# Patient Record
Sex: MALE | Race: WHITE | Employment: FULL TIME | URBAN - METROPOLITAN AREA
[De-identification: names, ages, dates, MRNs, and addresses within clinical notes are randomized per-mention and may not be internally consistent; named-entity substitution may affect disease eponyms.]

---

## 2017-08-22 ENCOUNTER — OFFICE VISIT (OUTPATIENT)
Dept: FAMILY MEDICINE CLINIC | Facility: CLINIC | Age: 57
End: 2017-08-22

## 2017-08-22 VITALS
OXYGEN SATURATION: 98 % | WEIGHT: 191 LBS | TEMPERATURE: 99 F | SYSTOLIC BLOOD PRESSURE: 118 MMHG | RESPIRATION RATE: 18 BRPM | HEIGHT: 70 IN | HEART RATE: 72 BPM | DIASTOLIC BLOOD PRESSURE: 82 MMHG | BODY MASS INDEX: 27.35 KG/M2

## 2017-08-22 DIAGNOSIS — K63.5 HYPERPLASTIC COLONIC POLYP, UNSPECIFIED PART OF COLON: Primary | ICD-10-CM

## 2017-08-22 PROCEDURE — 99213 OFFICE O/P EST LOW 20 MIN: CPT | Performed by: FAMILY MEDICINE

## 2017-08-22 NOTE — PROGRESS NOTES
First-time visit her to the practice previously on under the care of Gary Mensah. He is a  working at [employer] most of his day. He has a remote history of benign colon polyps. The father of 2 sons Tony Villanueva and Genet Guerrero.   Genet Guerrero suffers from higher fun

## 2017-10-17 ENCOUNTER — OFFICE VISIT (OUTPATIENT)
Dept: FAMILY MEDICINE CLINIC | Facility: CLINIC | Age: 57
End: 2017-10-17

## 2017-10-17 VITALS
BODY MASS INDEX: 27.63 KG/M2 | HEIGHT: 70 IN | HEART RATE: 62 BPM | TEMPERATURE: 98 F | DIASTOLIC BLOOD PRESSURE: 84 MMHG | SYSTOLIC BLOOD PRESSURE: 124 MMHG | OXYGEN SATURATION: 98 % | RESPIRATION RATE: 18 BRPM | WEIGHT: 193 LBS

## 2017-10-17 DIAGNOSIS — Z13.29 SCREENING FOR THYROID DISORDER: ICD-10-CM

## 2017-10-17 DIAGNOSIS — Z00.00 LABORATORY EXAM ORDERED AS PART OF ROUTINE GENERAL MEDICAL EXAMINATION: Primary | ICD-10-CM

## 2017-10-17 DIAGNOSIS — Z13.1 SCREENING FOR DIABETES MELLITUS: ICD-10-CM

## 2017-10-17 DIAGNOSIS — Z11.59 ENCOUNTER FOR HEPATITIS C SCREENING TEST FOR LOW RISK PATIENT: ICD-10-CM

## 2017-10-17 DIAGNOSIS — Z00.00 ROUTINE GENERAL MEDICAL EXAMINATION AT A HEALTH CARE FACILITY: ICD-10-CM

## 2017-10-17 DIAGNOSIS — Z12.5 ENCOUNTER FOR SCREENING FOR MALIGNANT NEOPLASM OF PROSTATE: ICD-10-CM

## 2017-10-17 DIAGNOSIS — Z13.21 ENCOUNTER FOR VITAMIN DEFICIENCY SCREENING: ICD-10-CM

## 2017-10-17 DIAGNOSIS — Z13.220 SCREENING FOR LIPID DISORDERS: ICD-10-CM

## 2017-10-17 DIAGNOSIS — Z13.0 SCREENING, ANEMIA, DEFICIENCY, IRON: ICD-10-CM

## 2017-10-17 PROCEDURE — 99396 PREV VISIT EST AGE 40-64: CPT | Performed by: FAMILY MEDICINE

## 2017-10-17 RX ORDER — SILDENAFIL 50 MG/1
50 TABLET, FILM COATED ORAL
Qty: 12 TABLET | Refills: 6 | Status: SHIPPED | OUTPATIENT
Start: 2017-10-17 | End: 2019-02-01

## 2017-10-17 NOTE — PROGRESS NOTES
Presents for annual physical.  Has no complaints. Exercises regularly and without adverse side effects. Does not smoke and drinks alcohol modestly. He is up-to-date on vaccines and declines this years flu shot.     Sees a dentist and an eye doctor teto

## 2017-10-18 ENCOUNTER — LAB ENCOUNTER (OUTPATIENT)
Dept: LAB | Age: 57
End: 2017-10-18
Attending: FAMILY MEDICINE
Payer: COMMERCIAL

## 2017-10-18 ENCOUNTER — TELEPHONE (OUTPATIENT)
Dept: FAMILY MEDICINE CLINIC | Facility: CLINIC | Age: 57
End: 2017-10-18

## 2017-10-18 DIAGNOSIS — Z13.220 SCREENING FOR LIPID DISORDERS: ICD-10-CM

## 2017-10-18 DIAGNOSIS — Z00.00 LABORATORY EXAM ORDERED AS PART OF ROUTINE GENERAL MEDICAL EXAMINATION: ICD-10-CM

## 2017-10-18 DIAGNOSIS — Z11.59 ENCOUNTER FOR HEPATITIS C SCREENING TEST FOR LOW RISK PATIENT: ICD-10-CM

## 2017-10-18 DIAGNOSIS — Z13.21 ENCOUNTER FOR VITAMIN DEFICIENCY SCREENING: ICD-10-CM

## 2017-10-18 DIAGNOSIS — Z12.5 ENCOUNTER FOR SCREENING FOR MALIGNANT NEOPLASM OF PROSTATE: ICD-10-CM

## 2017-10-18 DIAGNOSIS — Z13.1 SCREENING FOR DIABETES MELLITUS: ICD-10-CM

## 2017-10-18 DIAGNOSIS — Z13.0 SCREENING, ANEMIA, DEFICIENCY, IRON: ICD-10-CM

## 2017-10-18 DIAGNOSIS — Z13.29 SCREENING FOR THYROID DISORDER: ICD-10-CM

## 2017-10-18 PROCEDURE — 36415 COLL VENOUS BLD VENIPUNCTURE: CPT | Performed by: FAMILY MEDICINE

## 2017-10-18 PROCEDURE — 80061 LIPID PANEL: CPT | Performed by: FAMILY MEDICINE

## 2017-10-18 PROCEDURE — 80050 GENERAL HEALTH PANEL: CPT | Performed by: FAMILY MEDICINE

## 2017-10-18 PROCEDURE — 82306 VITAMIN D 25 HYDROXY: CPT | Performed by: FAMILY MEDICINE

## 2017-10-18 PROCEDURE — 86803 HEPATITIS C AB TEST: CPT | Performed by: FAMILY MEDICINE

## 2017-10-18 PROCEDURE — 84153 ASSAY OF PSA TOTAL: CPT | Performed by: FAMILY MEDICINE

## 2017-10-18 NOTE — TELEPHONE ENCOUNTER
prior auth rejection letter received from [employer] for the Viagra. paperwork placed at [employer] triage bin for fup.

## 2017-11-03 NOTE — TELEPHONE ENCOUNTER
I called pt and he said he would call back to make cpe w/[facility] but I left another msg to confirm since Moni Collier is listed as cpe

## 2017-11-03 NOTE — TELEPHONE ENCOUNTER
LFV:10/12/2016  Future Appt: none on file, due for cpe  Last Rx:10/12/16 for year supply  Last Labs:10/18/17 cmp and lipid stable  Per protocol to provider and   Please help pt schedule annual visit

## 2017-11-03 NOTE — TELEPHONE ENCOUNTER
Please call patient. It was my understanding that he is now seeing Dr. Kathy Nicole. Is this not the case?

## 2017-11-04 ENCOUNTER — HOSPITAL ENCOUNTER (OUTPATIENT)
Dept: CT IMAGING | Age: 57
Discharge: HOME | End: 2017-11-04
Attending: PHYSICIAN ASSISTANT
Payer: COMMERCIAL

## 2017-11-04 DIAGNOSIS — R10.32 LLQ PAIN: ICD-10-CM

## 2017-11-04 DIAGNOSIS — R19.4 CHANGE IN STOOL HABITS: ICD-10-CM

## 2017-11-04 PROCEDURE — 74177 CT ABD & PELVIS W/CONTRAST: CPT | Performed by: PHYSICIAN ASSISTANT

## 2017-11-08 RX ORDER — PRAVASTATIN SODIUM 20 MG
TABLET ORAL
Qty: 90 TABLET | Refills: 1 | Status: SHIPPED | OUTPATIENT
Start: 2017-11-08 | End: 2018-03-19

## 2017-11-13 ENCOUNTER — OFFICE VISIT (OUTPATIENT)
Dept: FAMILY MEDICINE CLINIC | Facility: CLINIC | Age: 57
End: 2017-11-13

## 2017-11-13 VITALS
RESPIRATION RATE: 17 BRPM | HEART RATE: 67 BPM | BODY MASS INDEX: 27.2 KG/M2 | HEIGHT: 70 IN | OXYGEN SATURATION: 87 % | DIASTOLIC BLOOD PRESSURE: 80 MMHG | SYSTOLIC BLOOD PRESSURE: 120 MMHG | TEMPERATURE: 98 F | WEIGHT: 190 LBS

## 2017-11-13 DIAGNOSIS — K57.30 DIVERTICULOSIS OF LARGE INTESTINE WITHOUT HEMORRHAGE: Primary | ICD-10-CM

## 2017-11-13 PROCEDURE — 99213 OFFICE O/P EST LOW 20 MIN: CPT | Performed by: FAMILY MEDICINE

## 2017-11-13 NOTE — PROGRESS NOTES
Patient is here in follow-up having been seen and worked up for abdominal pain. He is been having a bit of delay in establishing a colonoscopy this year and we will help him with that process.     He did have an abdominal ultrasound which did show divertic

## 2018-01-08 RX ORDER — VALACYCLOVIR HYDROCHLORIDE 1 G/1
TABLET, FILM COATED ORAL
Qty: 20 TABLET | Refills: 1 | OUTPATIENT
Start: 2018-01-08

## 2018-01-08 NOTE — TELEPHONE ENCOUNTER
E request  Medication(s) to Refill:   Pending Prescriptions Disp Refills    VALACYCLOVIR HCL 1 G Oral Tab [Pharmacy Med Name: ValACYclovir HCl Oral Tablet 1 GM] 20 tablet 1     Sig: TAKE 2 TABLETS BY MOUTH 2 (TWO) TIMES DAILY           Last Time Medication

## 2018-01-09 RX ORDER — VALACYCLOVIR HYDROCHLORIDE 1 G/1
TABLET, FILM COATED ORAL
Qty: 20 TABLET | Refills: 2 | Status: SHIPPED | OUTPATIENT
Start: 2018-01-09 | End: 2019-08-09

## 2018-02-01 ENCOUNTER — HOSPITAL ENCOUNTER (OUTPATIENT)
Dept: MRI IMAGING | Age: 58
Discharge: HOME | End: 2018-02-01
Attending: INTERNAL MEDICINE
Payer: COMMERCIAL

## 2018-02-01 DIAGNOSIS — M25.561 PAIN IN RIGHT KNEE: ICD-10-CM

## 2018-02-01 PROCEDURE — 73721 MRI JNT OF LWR EXTRE W/O DYE: CPT | Performed by: INTERNAL MEDICINE

## 2018-02-20 ENCOUNTER — TELEPHONE (OUTPATIENT)
Dept: FAMILY MEDICINE CLINIC | Facility: CLINIC | Age: 58
End: 2018-02-20

## 2018-03-19 ENCOUNTER — APPOINTMENT (OUTPATIENT)
Dept: LAB | Age: 58
End: 2018-03-19
Attending: FAMILY MEDICINE
Payer: COMMERCIAL

## 2018-03-19 ENCOUNTER — OFFICE VISIT (OUTPATIENT)
Dept: FAMILY MEDICINE CLINIC | Facility: CLINIC | Age: 58
End: 2018-03-19

## 2018-03-19 ENCOUNTER — LABORATORY ENCOUNTER (OUTPATIENT)
Dept: LAB | Age: 58
End: 2018-03-19
Attending: FAMILY MEDICINE
Payer: COMMERCIAL

## 2018-03-19 VITALS
HEIGHT: 70 IN | SYSTOLIC BLOOD PRESSURE: 110 MMHG | DIASTOLIC BLOOD PRESSURE: 60 MMHG | HEART RATE: 68 BPM | WEIGHT: 192 LBS | OXYGEN SATURATION: 98 % | BODY MASS INDEX: 27.49 KG/M2 | RESPIRATION RATE: 18 BRPM

## 2018-03-19 DIAGNOSIS — Z01.818 PREOP TESTING: ICD-10-CM

## 2018-03-19 DIAGNOSIS — Z01.818 PREOP TESTING: Primary | ICD-10-CM

## 2018-03-19 LAB
ALBUMIN SERPL-MCNC: 4.3 G/DL (ref 3.5–4.8)
ALP LIVER SERPL-CCNC: 65 U/L (ref 45–117)
ALT SERPL-CCNC: 29 U/L (ref 17–63)
AST SERPL-CCNC: 18 U/L (ref 15–41)
ATRIAL RATE: 65 BPM
BASOPHILS # BLD AUTO: 0.03 X10(3) UL (ref 0–0.1)
BASOPHILS NFR BLD AUTO: 0.4 %
BILIRUB SERPL-MCNC: 0.6 MG/DL (ref 0.1–2)
BILIRUB UR QL STRIP.AUTO: NEGATIVE
BUN BLD-MCNC: 16 MG/DL (ref 8–20)
CALCIUM BLD-MCNC: 9.4 MG/DL (ref 8.3–10.3)
CHLORIDE: 104 MMOL/L (ref 101–111)
CO2: 29 MMOL/L (ref 22–32)
COLOR UR AUTO: YELLOW
CREAT BLD-MCNC: 1.28 MG/DL (ref 0.7–1.3)
EOSINOPHIL # BLD AUTO: 0.07 X10(3) UL (ref 0–0.3)
EOSINOPHIL NFR BLD AUTO: 1 %
ERYTHROCYTE [DISTWIDTH] IN BLOOD BY AUTOMATED COUNT: 12.2 % (ref 11.5–16)
GLUCOSE BLD-MCNC: 98 MG/DL (ref 70–99)
GLUCOSE UR STRIP.AUTO-MCNC: NEGATIVE MG/DL
HAV IGM SER QL: NONREACTIVE
HBV CORE IGM SER QL: NONREACTIVE
HBV SURFACE AG SERPL QL IA: NONREACTIVE
HCT VFR BLD AUTO: 48.1 % (ref 37–53)
HEPATITIS C VIRUS AB INTERPRETATION: NONREACTIVE
HGB BLD-MCNC: 15.8 G/DL (ref 13–17)
IMMATURE GRANULOCYTE COUNT: 0.03 X10(3) UL (ref 0–1)
IMMATURE GRANULOCYTE RATIO %: 0.4 %
KETONES UR STRIP.AUTO-MCNC: NEGATIVE MG/DL
LEUKOCYTE ESTERASE UR QL STRIP.AUTO: NEGATIVE
LYMPHOCYTES # BLD AUTO: 1.64 X10(3) UL (ref 0.9–4)
LYMPHOCYTES NFR BLD AUTO: 22.9 %
M PROTEIN MFR SERPL ELPH: 7.4 G/DL (ref 6.1–8.3)
MCH RBC QN AUTO: 30.3 PG (ref 27–33.2)
MCHC RBC AUTO-ENTMCNC: 32.8 G/DL (ref 31–37)
MCV RBC AUTO: 92.1 FL (ref 80–99)
MONOCYTES # BLD AUTO: 0.43 X10(3) UL (ref 0.1–1)
MONOCYTES NFR BLD AUTO: 6 %
NEUTROPHIL ABS PRELIM: 4.97 X10 (3) UL (ref 1.3–6.7)
NEUTROPHILS # BLD AUTO: 4.97 X10(3) UL (ref 1.3–6.7)
NEUTROPHILS NFR BLD AUTO: 69.3 %
NITRITE UR QL STRIP.AUTO: NEGATIVE
P AXIS: 39 DEGREES
P-R INTERVAL: 188 MS
PH UR STRIP.AUTO: 5 [PH] (ref 4.5–8)
PLATELET # BLD AUTO: 312 10(3)UL (ref 150–450)
POTASSIUM SERPL-SCNC: 4.6 MMOL/L (ref 3.6–5.1)
PROT UR STRIP.AUTO-MCNC: NEGATIVE MG/DL
Q-T INTERVAL: 410 MS
QRS DURATION: 82 MS
QTC CALCULATION (BEZET): 426 MS
R AXIS: 63 DEGREES
RBC # BLD AUTO: 5.22 X10(6)UL (ref 4.3–5.7)
RBC UR QL AUTO: NEGATIVE
RED CELL DISTRIBUTION WIDTH-SD: 41.9 FL (ref 35.1–46.3)
SODIUM SERPL-SCNC: 140 MMOL/L (ref 136–144)
SP GR UR STRIP.AUTO: 1.02 (ref 1–1.03)
T AXIS: 42 DEGREES
UROBILINOGEN UR STRIP.AUTO-MCNC: <2 MG/DL
VENTRICULAR RATE: 65 BPM
WBC # BLD AUTO: 7.2 X10(3) UL (ref 4–13)

## 2018-03-19 PROCEDURE — 85025 COMPLETE CBC W/AUTO DIFF WBC: CPT

## 2018-03-19 PROCEDURE — 80053 COMPREHEN METABOLIC PANEL: CPT

## 2018-03-19 PROCEDURE — 36415 COLL VENOUS BLD VENIPUNCTURE: CPT

## 2018-03-19 PROCEDURE — 93005 ELECTROCARDIOGRAM TRACING: CPT

## 2018-03-19 PROCEDURE — 93010 ELECTROCARDIOGRAM REPORT: CPT | Performed by: INTERNAL MEDICINE

## 2018-03-19 PROCEDURE — 87389 HIV-1 AG W/HIV-1&-2 AB AG IA: CPT

## 2018-03-19 PROCEDURE — 99244 OFF/OP CNSLTJ NEW/EST MOD 40: CPT | Performed by: FAMILY MEDICINE

## 2018-03-19 PROCEDURE — 80074 ACUTE HEPATITIS PANEL: CPT

## 2018-03-19 PROCEDURE — 81003 URINALYSIS AUTO W/O SCOPE: CPT

## 2018-03-19 RX ORDER — PRAVASTATIN SODIUM 20 MG
TABLET ORAL
Qty: 90 TABLET | Refills: 1 | Status: SHIPPED | OUTPATIENT
Start: 2018-03-19 | End: 2018-11-12

## 2018-03-19 NOTE — PROGRESS NOTES
I have been asked to serve as medical evaluator for mutual patient Volodymyr Buckner, birthdate of 08/08/1960. Patient is scheduled for March 28 arthroscopic repair of a torn ACL ligament of the right knee.   Surgery will be done by Dr. Sammi Pearce at [facility]

## 2018-10-25 ENCOUNTER — OFFICE VISIT (OUTPATIENT)
Dept: FAMILY MEDICINE CLINIC | Facility: CLINIC | Age: 58
End: 2018-10-25
Payer: COMMERCIAL

## 2018-10-25 ENCOUNTER — LAB ENCOUNTER (OUTPATIENT)
Dept: LAB | Age: 58
End: 2018-10-25
Attending: FAMILY MEDICINE
Payer: COMMERCIAL

## 2018-10-25 VITALS
HEART RATE: 64 BPM | DIASTOLIC BLOOD PRESSURE: 78 MMHG | BODY MASS INDEX: 26.92 KG/M2 | TEMPERATURE: 99 F | HEIGHT: 70 IN | RESPIRATION RATE: 16 BRPM | WEIGHT: 188 LBS | SYSTOLIC BLOOD PRESSURE: 108 MMHG

## 2018-10-25 DIAGNOSIS — Z11.59 ENCOUNTER FOR HEPATITIS C SCREENING TEST FOR LOW RISK PATIENT: ICD-10-CM

## 2018-10-25 DIAGNOSIS — Z00.00 WELL ADULT EXAM: Primary | ICD-10-CM

## 2018-10-25 DIAGNOSIS — Z12.5 ENCOUNTER FOR SCREENING FOR MALIGNANT NEOPLASM OF PROSTATE: ICD-10-CM

## 2018-10-25 DIAGNOSIS — Z00.00 LABORATORY EXAM ORDERED AS PART OF ROUTINE GENERAL MEDICAL EXAMINATION: ICD-10-CM

## 2018-10-25 DIAGNOSIS — E55.9 HYPOVITAMINOSIS D: ICD-10-CM

## 2018-10-25 PROCEDURE — 99396 PREV VISIT EST AGE 40-64: CPT | Performed by: FAMILY MEDICINE

## 2018-10-25 PROCEDURE — 36415 COLL VENOUS BLD VENIPUNCTURE: CPT | Performed by: FAMILY MEDICINE

## 2018-10-25 PROCEDURE — 86803 HEPATITIS C AB TEST: CPT | Performed by: FAMILY MEDICINE

## 2018-10-25 PROCEDURE — 80061 LIPID PANEL: CPT | Performed by: FAMILY MEDICINE

## 2018-10-25 PROCEDURE — 84153 ASSAY OF PSA TOTAL: CPT | Performed by: FAMILY MEDICINE

## 2018-10-25 PROCEDURE — 82306 VITAMIN D 25 HYDROXY: CPT | Performed by: FAMILY MEDICINE

## 2018-10-25 PROCEDURE — 80050 GENERAL HEALTH PANEL: CPT | Performed by: FAMILY MEDICINE

## 2018-10-25 NOTE — PROGRESS NOTES
Here for a physical exam has no complaints. He is only on a statin agent and occasional Valtrex. He is polite fully declining the flu shot this year. Rest of the review of systems is unremarkable    Exam physical overall is unremarkable.   His vital sign

## 2018-11-12 RX ORDER — PRAVASTATIN SODIUM 20 MG
TABLET ORAL
Qty: 30 TABLET | Refills: 0 | Status: SHIPPED | OUTPATIENT
Start: 2018-11-12 | End: 2019-02-01

## 2018-11-15 RX ORDER — PRAVASTATIN SODIUM 20 MG
TABLET ORAL
Qty: 30 TABLET | Refills: 0 | Status: SHIPPED | OUTPATIENT
Start: 2018-11-15 | End: 2019-01-12

## 2019-01-14 RX ORDER — PRAVASTATIN SODIUM 20 MG
TABLET ORAL
Qty: 30 TABLET | Refills: 2 | Status: SHIPPED | OUTPATIENT
Start: 2019-01-14 | End: 2019-04-07

## 2019-02-01 ENCOUNTER — OFFICE VISIT (OUTPATIENT)
Dept: FAMILY MEDICINE CLINIC | Facility: CLINIC | Age: 59
End: 2019-02-01
Payer: COMMERCIAL

## 2019-02-01 VITALS
BODY MASS INDEX: 27.49 KG/M2 | OXYGEN SATURATION: 97 % | DIASTOLIC BLOOD PRESSURE: 82 MMHG | HEIGHT: 70 IN | HEART RATE: 64 BPM | RESPIRATION RATE: 16 BRPM | WEIGHT: 192 LBS | TEMPERATURE: 98 F | SYSTOLIC BLOOD PRESSURE: 120 MMHG

## 2019-02-01 DIAGNOSIS — N52.9 ED (ERECTILE DYSFUNCTION) OF ORGANIC ORIGIN: Primary | ICD-10-CM

## 2019-02-01 PROCEDURE — 99213 OFFICE O/P EST LOW 20 MIN: CPT | Performed by: FAMILY MEDICINE

## 2019-02-01 RX ORDER — TADALAFIL 20 MG/1
20 TABLET ORAL
Qty: 12 TABLET | Refills: 11 | Status: SHIPPED | OUTPATIENT
Start: 2019-02-01

## 2019-02-01 NOTE — PROGRESS NOTES
Patient is here to discuss some post void residual urine issues and for a refill of Cialis 20 mg, a medicine he has had in the past successfully denies any pain.     Exam is otherwise unremarkable vital signs normal current medications these include valacyc

## 2019-04-08 RX ORDER — PRAVASTATIN SODIUM 20 MG
TABLET ORAL
Qty: 30 TABLET | Refills: 1 | Status: SHIPPED | OUTPATIENT
Start: 2019-04-08 | End: 2019-06-12

## 2019-06-12 RX ORDER — PRAVASTATIN SODIUM 20 MG
TABLET ORAL
Qty: 30 TABLET | Refills: 0 | Status: SHIPPED | OUTPATIENT
Start: 2019-06-12 | End: 2019-07-14

## 2019-06-17 RX ORDER — PRAVASTATIN SODIUM 20 MG
TABLET ORAL
Qty: 30 TABLET | Refills: 0 | OUTPATIENT
Start: 2019-06-17

## 2019-07-15 RX ORDER — PRAVASTATIN SODIUM 20 MG
TABLET ORAL
Qty: 30 TABLET | Refills: 0 | Status: SHIPPED | OUTPATIENT
Start: 2019-07-15 | End: 2019-08-14

## 2019-08-09 RX ORDER — VALACYCLOVIR HYDROCHLORIDE 1 G/1
TABLET, FILM COATED ORAL
Qty: 20 TABLET | Refills: 1 | Status: SHIPPED | OUTPATIENT
Start: 2019-08-09 | End: 2019-12-31

## 2019-08-14 RX ORDER — PRAVASTATIN SODIUM 20 MG
TABLET ORAL
Qty: 90 TABLET | Refills: 1 | Status: SHIPPED | OUTPATIENT
Start: 2019-08-14 | End: 2020-02-08

## 2019-10-25 ENCOUNTER — LAB ENCOUNTER (OUTPATIENT)
Dept: LAB | Age: 59
End: 2019-10-25
Attending: FAMILY MEDICINE
Payer: COMMERCIAL

## 2019-10-25 ENCOUNTER — OFFICE VISIT (OUTPATIENT)
Dept: FAMILY MEDICINE CLINIC | Facility: CLINIC | Age: 59
End: 2019-10-25
Payer: COMMERCIAL

## 2019-10-25 VITALS
SYSTOLIC BLOOD PRESSURE: 124 MMHG | WEIGHT: 188 LBS | HEART RATE: 64 BPM | BODY MASS INDEX: 26.92 KG/M2 | HEIGHT: 70 IN | DIASTOLIC BLOOD PRESSURE: 78 MMHG | TEMPERATURE: 99 F | OXYGEN SATURATION: 98 % | RESPIRATION RATE: 16 BRPM

## 2019-10-25 DIAGNOSIS — E55.9 HYPOVITAMINOSIS D: ICD-10-CM

## 2019-10-25 DIAGNOSIS — Z12.11 SCREENING FOR MALIGNANT NEOPLASM OF COLON: ICD-10-CM

## 2019-10-25 DIAGNOSIS — Z00.00 WELL ADULT EXAM: Primary | ICD-10-CM

## 2019-10-25 DIAGNOSIS — Z12.5 SCREENING FOR MALIGNANT NEOPLASM OF PROSTATE: ICD-10-CM

## 2019-10-25 DIAGNOSIS — Z00.00 LABORATORY EXAM ORDERED AS PART OF ROUTINE GENERAL MEDICAL EXAMINATION: ICD-10-CM

## 2019-10-25 DIAGNOSIS — N52.9 ED (ERECTILE DYSFUNCTION) OF ORGANIC ORIGIN: ICD-10-CM

## 2019-10-25 PROCEDURE — 80050 GENERAL HEALTH PANEL: CPT | Performed by: FAMILY MEDICINE

## 2019-10-25 PROCEDURE — 84153 ASSAY OF PSA TOTAL: CPT | Performed by: FAMILY MEDICINE

## 2019-10-25 PROCEDURE — 99396 PREV VISIT EST AGE 40-64: CPT | Performed by: FAMILY MEDICINE

## 2019-10-25 PROCEDURE — 36415 COLL VENOUS BLD VENIPUNCTURE: CPT | Performed by: FAMILY MEDICINE

## 2019-10-25 PROCEDURE — 82306 VITAMIN D 25 HYDROXY: CPT | Performed by: FAMILY MEDICINE

## 2019-10-25 PROCEDURE — 80061 LIPID PANEL: CPT | Performed by: FAMILY MEDICINE

## 2019-10-25 RX ORDER — SILDENAFIL 50 MG/1
50 TABLET, FILM COATED ORAL
Qty: 20 TABLET | Refills: 10 | Status: SHIPPED | OUTPATIENT
Start: 2019-10-25

## 2019-10-25 NOTE — PROGRESS NOTES
This visit. Feels well. Exercises regularly. Sees a dentist and an eye doctor regularly. He has a history of ED for which he takes testosterone pellets. He will be doing this for only 1 year. He is being monitored by that group.     Physical exam kenzie

## 2019-11-07 ENCOUNTER — OFFICE VISIT (OUTPATIENT)
Dept: FAMILY MEDICINE CLINIC | Facility: CLINIC | Age: 59
End: 2019-11-07
Payer: COMMERCIAL

## 2019-11-07 VITALS
OXYGEN SATURATION: 98 % | DIASTOLIC BLOOD PRESSURE: 78 MMHG | RESPIRATION RATE: 16 BRPM | TEMPERATURE: 99 F | HEART RATE: 80 BPM | HEIGHT: 70 IN | BODY MASS INDEX: 26.92 KG/M2 | SYSTOLIC BLOOD PRESSURE: 120 MMHG | WEIGHT: 188 LBS

## 2019-11-07 DIAGNOSIS — N05.9 NEPHRITIS: Primary | ICD-10-CM

## 2019-11-07 PROCEDURE — 90471 IMMUNIZATION ADMIN: CPT | Performed by: FAMILY MEDICINE

## 2019-11-07 PROCEDURE — 99213 OFFICE O/P EST LOW 20 MIN: CPT | Performed by: FAMILY MEDICINE

## 2019-11-07 PROCEDURE — 90686 IIV4 VACC NO PRSV 0.5 ML IM: CPT | Performed by: FAMILY MEDICINE

## 2019-11-07 NOTE — PROGRESS NOTES
Here on my suggestion. This is a gentleman who I recently saw in the office and as a result of blood testing have noted a small but definite rise in his serum creatinine without symptoms.   He takes chronic NSAID medicine primarily Celebrex for generalized

## 2019-11-13 ENCOUNTER — APPOINTMENT (OUTPATIENT)
Dept: LAB | Facility: HOSPITAL | Age: 59
End: 2019-11-13
Attending: FAMILY MEDICINE
Payer: COMMERCIAL

## 2019-11-13 DIAGNOSIS — Z12.11 SCREENING FOR MALIGNANT NEOPLASM OF COLON: ICD-10-CM

## 2019-11-13 DIAGNOSIS — Z00.00 LABORATORY EXAM ORDERED AS PART OF ROUTINE GENERAL MEDICAL EXAMINATION: ICD-10-CM

## 2019-11-13 PROCEDURE — 82274 ASSAY TEST FOR BLOOD FECAL: CPT

## 2019-12-16 ENCOUNTER — OFFICE VISIT (OUTPATIENT)
Dept: NEPHROLOGY | Facility: CLINIC | Age: 59
End: 2019-12-16
Payer: COMMERCIAL

## 2019-12-16 VITALS — WEIGHT: 188 LBS | BODY MASS INDEX: 27 KG/M2 | DIASTOLIC BLOOD PRESSURE: 86 MMHG | SYSTOLIC BLOOD PRESSURE: 138 MMHG

## 2019-12-16 DIAGNOSIS — N18.2 CKD (CHRONIC KIDNEY DISEASE) STAGE 2, GFR 60-89 ML/MIN: Primary | ICD-10-CM

## 2019-12-16 PROCEDURE — 99243 OFF/OP CNSLTJ NEW/EST LOW 30: CPT | Performed by: INTERNAL MEDICINE

## 2019-12-16 NOTE — PROGRESS NOTES
Nephrology Consult Note    REASON FOR CONSULT: CKD 3    ASSESSMENT/PLAN:        1) CKD 2- serum creatinine is fluctuated between 1.2 to 1.5 mg/dL since initial labs in 2015 without any specific trend; this likely reflects a remote insult as he does not hav to a hockey injury. Has no other major medical issues. No history of cardiac pulmonary or hepatic disease. No history of acute or chronic renal failure gross microscopic hematuria proteinuria kidney stones or infections.   No family history of kidney dis Dysfunction.  12 tablet 11   • celecoxib (CELEBREX) 200 MG Oral Cap   1       Allergies:    Penicillins             RASH, SWELLING    Comment:\"Penicillin V Potassium (critical)\" per E-Clinical    Social History:  Social History    Socioeconomic History

## 2019-12-31 RX ORDER — VALACYCLOVIR HYDROCHLORIDE 1 G/1
TABLET, FILM COATED ORAL
Qty: 20 TABLET | Refills: 0 | Status: SHIPPED | OUTPATIENT
Start: 2019-12-31 | End: 2020-03-30

## 2020-02-08 RX ORDER — PRAVASTATIN SODIUM 20 MG
TABLET ORAL
Qty: 90 TABLET | Refills: 3 | Status: SHIPPED | OUTPATIENT
Start: 2020-02-08 | End: 2021-02-09

## 2020-03-30 RX ORDER — VALACYCLOVIR HYDROCHLORIDE 1 G/1
TABLET, FILM COATED ORAL
Qty: 20 TABLET | Refills: 0 | Status: SHIPPED | OUTPATIENT
Start: 2020-03-30 | End: 2020-04-29

## 2020-04-29 RX ORDER — VALACYCLOVIR HYDROCHLORIDE 1 G/1
TABLET, FILM COATED ORAL
Qty: 20 TABLET | Refills: 0 | Status: SHIPPED | OUTPATIENT
Start: 2020-04-29 | End: 2020-06-29

## 2020-06-29 RX ORDER — VALACYCLOVIR HYDROCHLORIDE 1 G/1
TABLET, FILM COATED ORAL
Qty: 20 TABLET | Refills: 0 | Status: SHIPPED | OUTPATIENT
Start: 2020-06-29 | End: 2020-08-10

## 2020-08-10 RX ORDER — VALACYCLOVIR HYDROCHLORIDE 1 G/1
TABLET, FILM COATED ORAL
Qty: 20 TABLET | Refills: 3 | Status: SHIPPED | OUTPATIENT
Start: 2020-08-10 | End: 2021-04-05

## 2020-08-18 ENCOUNTER — TELEPHONE (OUTPATIENT)
Dept: FAMILY MEDICINE CLINIC | Facility: CLINIC | Age: 60
End: 2020-08-18

## 2020-08-18 NOTE — TELEPHONE ENCOUNTER
Per Urologist note:    Patient Instructions   1. ED in men in their 42's and 52's is a single predictor of heart attack.  This is well known in the literature and I recommend you have a parallel workup with your primary care team/likely cardiologist for co

## 2020-08-18 NOTE — TELEPHONE ENCOUNTER
Patient called and said he went to the Urologist and he suggested that patient get a full cardiac assessment. He should contact his PCP for this. Please advise.

## 2020-08-21 NOTE — TELEPHONE ENCOUNTER
Per Ese Larson, pt should get ultrafast heart scan- self pay, no order needed. And make appt with SADE WE[facility]

## 2020-08-21 NOTE — TELEPHONE ENCOUNTER
appt made 8/28/20 with [facility]    Pt will schedule heart scan Strong peripheral pulses

## 2020-08-28 ENCOUNTER — OFFICE VISIT (OUTPATIENT)
Dept: FAMILY MEDICINE CLINIC | Facility: CLINIC | Age: 60
End: 2020-08-28
Payer: COMMERCIAL

## 2020-08-28 VITALS
OXYGEN SATURATION: 99 % | HEIGHT: 70 IN | BODY MASS INDEX: 27.06 KG/M2 | DIASTOLIC BLOOD PRESSURE: 70 MMHG | SYSTOLIC BLOOD PRESSURE: 126 MMHG | RESPIRATION RATE: 18 BRPM | WEIGHT: 189 LBS | HEART RATE: 65 BPM

## 2020-08-28 DIAGNOSIS — I73.9 PVD (PERIPHERAL VASCULAR DISEASE) (HCC): Primary | ICD-10-CM

## 2020-08-28 PROCEDURE — 3074F SYST BP LT 130 MM HG: CPT | Performed by: FAMILY MEDICINE

## 2020-08-28 PROCEDURE — 3008F BODY MASS INDEX DOCD: CPT | Performed by: FAMILY MEDICINE

## 2020-08-28 PROCEDURE — 99213 OFFICE O/P EST LOW 20 MIN: CPT | Performed by: FAMILY MEDICINE

## 2020-08-28 PROCEDURE — 3078F DIAST BP <80 MM HG: CPT | Performed by: FAMILY MEDICINE

## 2020-08-28 NOTE — PROGRESS NOTES
Patient is here to discuss recommended medical work-up as suggested by his urologist.  He is seeing the urologist because of urgency frequency. He is otherwise very healthy gentleman who exercises regularly. .  The one area of concern that he had was that

## 2020-08-29 ENCOUNTER — APPOINTMENT (OUTPATIENT)
Dept: LAB | Age: 60
End: 2020-08-29
Attending: FAMILY MEDICINE
Payer: COMMERCIAL

## 2020-08-29 DIAGNOSIS — I73.9 PVD (PERIPHERAL VASCULAR DISEASE) (HCC): ICD-10-CM

## 2020-08-30 LAB — SARS-COV-2 RNA RESP QL NAA+PROBE: NOT DETECTED

## 2020-08-31 ENCOUNTER — TELEPHONE (OUTPATIENT)
Dept: FAMILY MEDICINE CLINIC | Facility: CLINIC | Age: 60
End: 2020-08-31

## 2020-08-31 ENCOUNTER — HOSPITAL ENCOUNTER (OUTPATIENT)
Dept: CV DIAGNOSTICS | Age: 60
Discharge: HOME | End: 2020-08-31
Attending: FAMILY MEDICINE
Payer: COMMERCIAL

## 2020-08-31 DIAGNOSIS — I73.9 PVD (PERIPHERAL VASCULAR DISEASE) (HCC): ICD-10-CM

## 2020-08-31 PROCEDURE — 93017 CV STRESS TEST TRACING ONLY: CPT | Performed by: FAMILY MEDICINE

## 2020-08-31 PROCEDURE — 93018 CV STRESS TEST I&R ONLY: CPT | Performed by: FAMILY MEDICINE

## 2020-08-31 PROCEDURE — 93350 STRESS TTE ONLY: CPT | Performed by: FAMILY MEDICINE

## 2020-08-31 NOTE — TELEPHONE ENCOUNTER
Spoke to cardiology. Pt had covid testing on Saturday. Pt scheduled for echo Wednesdad.   Pt needs covid testing with in 72 hours of test.  There are 2 openings today pt could have echo done one at 1 pm at [place] and the other 245 at the [facility]

## 2020-08-31 NOTE — TELEPHONE ENCOUNTER
Pt has stress echo test scheduled for Wednesday 09/02/2020    Pt had covid test done on Saturday    Protocol is to have the test within 72 hours/ test is negative     Please advise

## 2020-10-26 ENCOUNTER — OFFICE VISIT (OUTPATIENT)
Dept: FAMILY MEDICINE CLINIC | Facility: CLINIC | Age: 60
End: 2020-10-26
Payer: COMMERCIAL

## 2020-10-26 VITALS
OXYGEN SATURATION: 98 % | TEMPERATURE: 98 F | WEIGHT: 192 LBS | SYSTOLIC BLOOD PRESSURE: 106 MMHG | HEIGHT: 70 IN | RESPIRATION RATE: 16 BRPM | DIASTOLIC BLOOD PRESSURE: 64 MMHG | HEART RATE: 59 BPM | BODY MASS INDEX: 27.49 KG/M2

## 2020-10-26 DIAGNOSIS — Z00.00 ANNUAL PHYSICAL EXAM: Primary | ICD-10-CM

## 2020-10-26 DIAGNOSIS — Z23 NEED FOR VACCINATION: ICD-10-CM

## 2020-10-26 PROCEDURE — 3078F DIAST BP <80 MM HG: CPT | Performed by: FAMILY MEDICINE

## 2020-10-26 PROCEDURE — 90471 IMMUNIZATION ADMIN: CPT | Performed by: FAMILY MEDICINE

## 2020-10-26 PROCEDURE — 99213 OFFICE O/P EST LOW 20 MIN: CPT | Performed by: FAMILY MEDICINE

## 2020-10-26 PROCEDURE — 90686 IIV4 VACC NO PRSV 0.5 ML IM: CPT | Performed by: FAMILY MEDICINE

## 2020-10-26 PROCEDURE — 3008F BODY MASS INDEX DOCD: CPT | Performed by: FAMILY MEDICINE

## 2020-10-26 PROCEDURE — 3074F SYST BP LT 130 MM HG: CPT | Performed by: FAMILY MEDICINE

## 2020-10-26 NOTE — PROGRESS NOTES
Patient presents today for wellness check no complaints. Reviewed all his medications he is not taking testosterone on a regular basis.     As part of his work-up he had stress echocardiogram performed recently that showed, asymmetrically, hypertensive r

## 2020-10-27 ENCOUNTER — TELEPHONE (OUTPATIENT)
Dept: FAMILY MEDICINE CLINIC | Facility: CLINIC | Age: 60
End: 2020-10-27

## 2020-10-27 NOTE — TELEPHONE ENCOUNTER
Pt states Dr. Hemanth Couch referred pt to Dr. Landon Jaramillo   For cardiology  They told him they do not take his insurance     Please advise    He has bcbs ppo

## 2020-11-10 ENCOUNTER — HOSPITAL ENCOUNTER (OUTPATIENT)
Age: 60
Discharge: HOME | End: 2020-11-10
Payer: COMMERCIAL

## 2020-11-10 VITALS
HEART RATE: 67 BPM | SYSTOLIC BLOOD PRESSURE: 122 MMHG | OXYGEN SATURATION: 97 % | TEMPERATURE: 99 F | RESPIRATION RATE: 20 BRPM | DIASTOLIC BLOOD PRESSURE: 70 MMHG

## 2020-11-10 DIAGNOSIS — B34.9 VIRAL SYNDROME: Primary | ICD-10-CM

## 2020-11-10 PROCEDURE — 99203 OFFICE O/P NEW LOW 30 MIN: CPT | Performed by: NURSE PRACTITIONER

## 2020-11-10 RX ORDER — DICLOFENAC SODIUM 75 MG/1
75 TABLET, DELAYED RELEASE ORAL 2 TIMES DAILY
COMMUNITY

## 2020-11-10 NOTE — ED PROVIDER NOTES
Patient Seen in: Immediate [address] [place] [address]      History   Patient presents with:  Testing  Myalgias  Fever    Stated Complaint: fever ab problems cramping    53-year-old male presents to the immediate care with complaints of 3 days of generalized body aches No             Review of Systems   Constitutional: Positive for chills and fever. HENT: Positive for congestion and postnasal drip. Respiratory: Negative. Gastrointestinal: Positive for diarrhea. Genitourinary: Negative.     All other systems revi following plan provided. The patient and/or family was also given written discharge instructions including information regarding today's visit and indications prompting immediate return and appropriate follow-up was given in writing.   Patient and/or famil

## 2020-11-13 NOTE — ED NOTES
Call placed to pt (or parent). Message left on voice mail thanking pt for using our services. Please call if any questions or concerns. Return phone number provided.   Result available on [facility]

## 2021-01-04 PROBLEM — K57.90 DIVERTICULOSIS: Status: ACTIVE | Noted: 2021-01-04

## 2021-01-04 PROBLEM — Z86.010 HISTORY OF ADENOMATOUS POLYP OF COLON: Status: ACTIVE | Noted: 2021-01-04

## 2021-01-04 PROBLEM — Z86.0101 HISTORY OF ADENOMATOUS POLYP OF COLON: Status: ACTIVE | Noted: 2021-01-04

## 2021-01-04 PROBLEM — K64.8 INTERNAL HEMORRHOIDS: Status: ACTIVE | Noted: 2021-01-04

## 2021-02-09 ENCOUNTER — TELEPHONE (OUTPATIENT)
Dept: FAMILY MEDICINE CLINIC | Facility: CLINIC | Age: 61
End: 2021-02-09

## 2021-02-09 RX ORDER — PRAVASTATIN SODIUM 20 MG
20 TABLET ORAL NIGHTLY
Qty: 90 TABLET | Refills: 0 | Status: SHIPPED | OUTPATIENT
Start: 2021-02-09 | End: 2021-05-10

## 2021-02-09 NOTE — TELEPHONE ENCOUNTER
Spoke to patient - informed him I can refill x 1. Patient aware he needs to complete labs and f/u with Dr. Kami Dalton in April.

## 2021-02-11 ENCOUNTER — LAB ENCOUNTER (OUTPATIENT)
Dept: LAB | Age: 61
End: 2021-02-11
Attending: FAMILY MEDICINE
Payer: COMMERCIAL

## 2021-02-11 DIAGNOSIS — Z00.00 ANNUAL PHYSICAL EXAM: ICD-10-CM

## 2021-02-11 LAB
ALBUMIN SERPL-MCNC: 4.2 G/DL (ref 3.4–5)
ALBUMIN/GLOB SERPL: 1.4 {RATIO} (ref 1–2)
ALP LIVER SERPL-CCNC: 54 U/L
ALT SERPL-CCNC: 29 U/L
ANION GAP SERPL CALC-SCNC: 3 MMOL/L (ref 0–18)
AST SERPL-CCNC: 15 U/L (ref 15–37)
BASOPHILS # BLD AUTO: 0.03 X10(3) UL (ref 0–0.2)
BASOPHILS NFR BLD AUTO: 0.5 %
BILIRUB SERPL-MCNC: 1.1 MG/DL (ref 0.1–2)
BUN BLD-MCNC: 32 MG/DL (ref 7–18)
BUN/CREAT SERPL: 21.9 (ref 10–20)
CALCIUM BLD-MCNC: 9.2 MG/DL (ref 8.5–10.1)
CHLORIDE SERPL-SCNC: 107 MMOL/L (ref 98–112)
CHOLEST SMN-MCNC: 190 MG/DL (ref ?–200)
CO2 SERPL-SCNC: 28 MMOL/L (ref 21–32)
COMPLEXED PSA SERPL-MCNC: 1.13 NG/ML (ref ?–4)
CREAT BLD-MCNC: 1.46 MG/DL
DEPRECATED RDW RBC AUTO: 44.6 FL (ref 35.1–46.3)
EOSINOPHIL # BLD AUTO: 0.08 X10(3) UL (ref 0–0.7)
EOSINOPHIL NFR BLD AUTO: 1.4 %
ERYTHROCYTE [DISTWIDTH] IN BLOOD BY AUTOMATED COUNT: 12.7 % (ref 11–15)
GLOBULIN PLAS-MCNC: 3.1 G/DL (ref 2.8–4.4)
GLUCOSE BLD-MCNC: 96 MG/DL (ref 70–99)
HCT VFR BLD AUTO: 49.6 %
HDLC SERPL-MCNC: 64 MG/DL (ref 40–59)
HGB BLD-MCNC: 16.2 G/DL
IMM GRANULOCYTES # BLD AUTO: 0.02 X10(3) UL (ref 0–1)
IMM GRANULOCYTES NFR BLD: 0.4 %
LDLC SERPL CALC-MCNC: 109 MG/DL (ref ?–100)
LYMPHOCYTES # BLD AUTO: 1.91 X10(3) UL (ref 1–4)
LYMPHOCYTES NFR BLD AUTO: 33.9 %
M PROTEIN MFR SERPL ELPH: 7.3 G/DL (ref 6.4–8.2)
MCH RBC QN AUTO: 31.2 PG (ref 26–34)
MCHC RBC AUTO-ENTMCNC: 32.7 G/DL (ref 31–37)
MCV RBC AUTO: 95.6 FL
MONOCYTES # BLD AUTO: 0.42 X10(3) UL (ref 0.1–1)
MONOCYTES NFR BLD AUTO: 7.5 %
NEUTROPHILS # BLD AUTO: 3.17 X10 (3) UL (ref 1.5–7.7)
NEUTROPHILS # BLD AUTO: 3.17 X10(3) UL (ref 1.5–7.7)
NEUTROPHILS NFR BLD AUTO: 56.3 %
NONHDLC SERPL-MCNC: 126 MG/DL (ref ?–130)
OSMOLALITY SERPL CALC.SUM OF ELEC: 293 MOSM/KG (ref 275–295)
PATIENT FASTING Y/N/NP: YES
PATIENT FASTING Y/N/NP: YES
PLATELET # BLD AUTO: 262 10(3)UL (ref 150–450)
POTASSIUM SERPL-SCNC: 4.8 MMOL/L (ref 3.5–5.1)
RBC # BLD AUTO: 5.19 X10(6)UL
SODIUM SERPL-SCNC: 138 MMOL/L (ref 136–145)
T4 FREE SERPL-MCNC: 1.1 NG/DL (ref 0.8–1.7)
TRIGL SERPL-MCNC: 86 MG/DL (ref 30–149)
TSI SER-ACNC: 1.9 MIU/ML (ref 0.36–3.74)
VIT D+METAB SERPL-MCNC: 44.6 NG/ML (ref 30–100)
VLDLC SERPL CALC-MCNC: 17 MG/DL (ref 0–30)
WBC # BLD AUTO: 5.6 X10(3) UL (ref 4–11)

## 2021-02-11 PROCEDURE — 80053 COMPREHEN METABOLIC PANEL: CPT

## 2021-02-11 PROCEDURE — 84439 ASSAY OF FREE THYROXINE: CPT

## 2021-02-11 PROCEDURE — 82306 VITAMIN D 25 HYDROXY: CPT

## 2021-02-11 PROCEDURE — 36415 COLL VENOUS BLD VENIPUNCTURE: CPT

## 2021-02-11 PROCEDURE — 84443 ASSAY THYROID STIM HORMONE: CPT

## 2021-02-11 PROCEDURE — 85025 COMPLETE CBC W/AUTO DIFF WBC: CPT

## 2021-02-11 PROCEDURE — 80061 LIPID PANEL: CPT

## 2021-02-17 ENCOUNTER — OFFICE VISIT (OUTPATIENT)
Dept: FAMILY MEDICINE CLINIC | Facility: CLINIC | Age: 61
End: 2021-02-17
Payer: COMMERCIAL

## 2021-02-17 VITALS
HEART RATE: 64 BPM | SYSTOLIC BLOOD PRESSURE: 122 MMHG | DIASTOLIC BLOOD PRESSURE: 76 MMHG | RESPIRATION RATE: 16 BRPM | BODY MASS INDEX: 27.92 KG/M2 | WEIGHT: 195 LBS | OXYGEN SATURATION: 98 % | HEIGHT: 70 IN

## 2021-02-17 DIAGNOSIS — Z51.81 MEDICATION MONITORING ENCOUNTER: Primary | ICD-10-CM

## 2021-02-17 PROCEDURE — 3078F DIAST BP <80 MM HG: CPT | Performed by: FAMILY MEDICINE

## 2021-02-17 PROCEDURE — 3008F BODY MASS INDEX DOCD: CPT | Performed by: FAMILY MEDICINE

## 2021-02-17 PROCEDURE — 99213 OFFICE O/P EST LOW 20 MIN: CPT | Performed by: FAMILY MEDICINE

## 2021-02-17 PROCEDURE — 3074F SYST BP LT 130 MM HG: CPT | Performed by: FAMILY MEDICINE

## 2021-02-17 NOTE — PROGRESS NOTES
[facility] Progress Note    SUBJECTIVE: Mini Broussard 64year old male is here today for Patient presents with:  Cholesterol: lab f/u      Emo, has gone through a lot of heart testing last late fall.  Normal stress echo other than hypertensive HPI    OBJECTIVE:  /76   Pulse 64   Resp 16   Ht 5' 10\" (1.778 m)   Wt 195 lb (88.5 kg)   SpO2 98%   BMI 27.98 kg/m²         Labs:          Meds:   Current Outpatient Medications   Medication Sig Dispense Refill   • Pravastatin Sodium 20 MG Oral Tab

## 2021-04-05 RX ORDER — VALACYCLOVIR HYDROCHLORIDE 1 G/1
1 TABLET, FILM COATED ORAL 2 TIMES DAILY
Qty: 20 TABLET | Refills: 1 | Status: SHIPPED | OUTPATIENT
Start: 2021-04-05 | End: 2021-10-20

## 2021-05-10 RX ORDER — PRAVASTATIN SODIUM 20 MG
20 TABLET ORAL NIGHTLY
Qty: 90 TABLET | Refills: 0 | Status: SHIPPED | OUTPATIENT
Start: 2021-05-10 | End: 2021-08-06

## 2021-08-03 DIAGNOSIS — R79.89 ELEVATED SERUM CREATININE: Primary | ICD-10-CM

## 2021-08-04 ENCOUNTER — TELEPHONE (OUTPATIENT)
Dept: FAMILY MEDICINE CLINIC | Facility: CLINIC | Age: 61
End: 2021-08-04

## 2021-08-04 DIAGNOSIS — Z20.828 EXPOSURE TO SARS-ASSOCIATED CORONAVIRUS: ICD-10-CM

## 2021-08-04 DIAGNOSIS — Z20.822 EXPOSURE TO COVID-19 VIRUS: Primary | ICD-10-CM

## 2021-08-04 NOTE — TELEPHONE ENCOUNTER
Pt going to get his BMP done (ordered)  He's also sched for his px in October and wants to know if Dr. Castillo Sims will be ordering more labs. If so, pls add.   Also wants to get his \"antibodies\" checked

## 2021-08-04 NOTE — TELEPHONE ENCOUNTER
Pt would like Covid antibodies checked. Pt states he had Covid 11/2020 and he has not had the vaccine yet. Okay for order?

## 2021-08-06 RX ORDER — PRAVASTATIN SODIUM 20 MG
20 TABLET ORAL NIGHTLY
Qty: 90 TABLET | Refills: 0 | Status: SHIPPED | OUTPATIENT
Start: 2021-08-06 | End: 2021-10-26

## 2021-10-20 RX ORDER — VALACYCLOVIR HYDROCHLORIDE 1 G/1
1 TABLET, FILM COATED ORAL 2 TIMES DAILY
Qty: 20 TABLET | Refills: 0 | Status: SHIPPED | OUTPATIENT
Start: 2021-10-20 | End: 2021-11-22

## 2021-10-25 ENCOUNTER — LAB ENCOUNTER (OUTPATIENT)
Dept: LAB | Age: 61
End: 2021-10-25
Attending: FAMILY MEDICINE
Payer: COMMERCIAL

## 2021-10-25 DIAGNOSIS — Z00.00 ROUTINE GENERAL MEDICAL EXAMINATION AT A HEALTH CARE FACILITY: ICD-10-CM

## 2021-10-25 DIAGNOSIS — Z20.822 EXPOSURE TO COVID-19 VIRUS: ICD-10-CM

## 2021-10-25 DIAGNOSIS — R79.89 ELEVATED SERUM CREATININE: ICD-10-CM

## 2021-10-25 PROCEDURE — 84443 ASSAY THYROID STIM HORMONE: CPT

## 2021-10-25 PROCEDURE — 82306 VITAMIN D 25 HYDROXY: CPT

## 2021-10-25 PROCEDURE — 85025 COMPLETE CBC W/AUTO DIFF WBC: CPT

## 2021-10-25 PROCEDURE — 36415 COLL VENOUS BLD VENIPUNCTURE: CPT

## 2021-10-25 PROCEDURE — 86769 SARS-COV-2 COVID-19 ANTIBODY: CPT

## 2021-10-25 PROCEDURE — 80053 COMPREHEN METABOLIC PANEL: CPT

## 2021-10-25 PROCEDURE — 80061 LIPID PANEL: CPT

## 2021-10-26 ENCOUNTER — OFFICE VISIT (OUTPATIENT)
Dept: FAMILY MEDICINE CLINIC | Facility: CLINIC | Age: 61
End: 2021-10-26
Payer: COMMERCIAL

## 2021-10-26 VITALS
DIASTOLIC BLOOD PRESSURE: 72 MMHG | BODY MASS INDEX: 27.92 KG/M2 | OXYGEN SATURATION: 98 % | WEIGHT: 195 LBS | SYSTOLIC BLOOD PRESSURE: 108 MMHG | HEART RATE: 68 BPM | HEIGHT: 70 IN | RESPIRATION RATE: 16 BRPM

## 2021-10-26 DIAGNOSIS — Z13.0 SCREENING FOR DEFICIENCY ANEMIA: ICD-10-CM

## 2021-10-26 DIAGNOSIS — Z13.21 ENCOUNTER FOR VITAMIN DEFICIENCY SCREENING: ICD-10-CM

## 2021-10-26 DIAGNOSIS — Z12.5 SCREENING FOR PROSTATE CANCER: ICD-10-CM

## 2021-10-26 DIAGNOSIS — Z13.29 SCREENING FOR THYROID DISORDER: ICD-10-CM

## 2021-10-26 DIAGNOSIS — Z00.00 WELLNESS EXAMINATION: Primary | ICD-10-CM

## 2021-10-26 DIAGNOSIS — E78.2 MIXED HYPERLIPIDEMIA: ICD-10-CM

## 2021-10-26 PROCEDURE — 3074F SYST BP LT 130 MM HG: CPT | Performed by: FAMILY MEDICINE

## 2021-10-26 PROCEDURE — 90686 IIV4 VACC NO PRSV 0.5 ML IM: CPT | Performed by: FAMILY MEDICINE

## 2021-10-26 PROCEDURE — 3008F BODY MASS INDEX DOCD: CPT | Performed by: FAMILY MEDICINE

## 2021-10-26 PROCEDURE — 99396 PREV VISIT EST AGE 40-64: CPT | Performed by: FAMILY MEDICINE

## 2021-10-26 PROCEDURE — 90471 IMMUNIZATION ADMIN: CPT | Performed by: FAMILY MEDICINE

## 2021-10-26 PROCEDURE — 3078F DIAST BP <80 MM HG: CPT | Performed by: FAMILY MEDICINE

## 2021-10-26 RX ORDER — PRAVASTATIN SODIUM 20 MG
20 TABLET ORAL NIGHTLY
Qty: 90 TABLET | Refills: 1 | Status: SHIPPED | OUTPATIENT
Start: 2021-10-26

## 2021-10-26 NOTE — PROGRESS NOTES
HPI:   Low Lawler is a 63year old male who presents for an Annual Health Visit. Follow up today, reviewing labs. Discussed creatinine elevated, does take diclofenac states once a day.     Has pain in his shoulder, and is doing acupucnture 7/25/2011      Past Surgical History:   Procedure Laterality Date   • COLONOSCOPY     • ELECTROCARDIOGRAM, COMPLETE  8/28/2003   • OTHER SURGICAL HISTORY  11/28/2012    endoscopy      Family History   Problem Relation Age of Onset   • Lipids Father adenopathy, no carotid bruit, no JVD, supple, symmetrical, trachea midline and thyroid not enlarged, symmetric, no tenderness/mass/nodules  Heart: S1, S2 normal, no murmur, click, rub or gallop, regular rate and rhythm  Lungs: clear to auscultation bilater

## 2021-11-22 RX ORDER — VALACYCLOVIR HYDROCHLORIDE 1 G/1
TABLET, FILM COATED ORAL 2 TIMES DAILY
Qty: 20 TABLET | Refills: 0 | Status: SHIPPED | OUTPATIENT
Start: 2021-11-22 | End: 2021-12-22

## 2021-12-22 RX ORDER — VALACYCLOVIR HYDROCHLORIDE 1 G/1
TABLET, FILM COATED ORAL 2 TIMES DAILY
Qty: 20 TABLET | Refills: 0 | Status: SHIPPED | OUTPATIENT
Start: 2021-12-22 | End: 2022-01-25

## 2022-01-25 RX ORDER — VALACYCLOVIR HYDROCHLORIDE 1 G/1
TABLET, FILM COATED ORAL 2 TIMES DAILY
Qty: 20 TABLET | Refills: 0 | Status: SHIPPED | OUTPATIENT
Start: 2022-01-25

## 2022-02-21 RX ORDER — VALACYCLOVIR HYDROCHLORIDE 1 G/1
TABLET, FILM COATED ORAL 2 TIMES DAILY
Qty: 20 TABLET | Refills: 0 | Status: SHIPPED | OUTPATIENT
Start: 2022-02-21

## 2022-05-02 RX ORDER — PRAVASTATIN SODIUM 20 MG
20 TABLET ORAL NIGHTLY
Qty: 90 TABLET | Refills: 0 | Status: SHIPPED | OUTPATIENT
Start: 2022-05-02

## 2022-07-29 RX ORDER — VALACYCLOVIR HYDROCHLORIDE 1 G/1
TABLET, FILM COATED ORAL 2 TIMES DAILY
Qty: 20 TABLET | Refills: 0 | Status: SHIPPED | OUTPATIENT
Start: 2022-07-29

## 2022-08-11 DIAGNOSIS — E78.2 MIXED HYPERLIPIDEMIA: ICD-10-CM

## 2022-08-11 RX ORDER — PRAVASTATIN SODIUM 20 MG
20 TABLET ORAL NIGHTLY
Qty: 90 TABLET | Refills: 0 | Status: SHIPPED | OUTPATIENT
Start: 2022-08-11

## 2022-09-19 RX ORDER — VALACYCLOVIR HYDROCHLORIDE 1 G/1
TABLET, FILM COATED ORAL 2 TIMES DAILY
Qty: 20 TABLET | Refills: 0 | Status: SHIPPED | OUTPATIENT
Start: 2022-09-19

## 2022-09-26 RX ORDER — TADALAFIL 20 MG/1
TABLET ORAL
Qty: 12 TABLET | Refills: 0 | Status: SHIPPED | OUTPATIENT
Start: 2022-09-26

## 2022-10-28 ENCOUNTER — OFFICE VISIT (OUTPATIENT)
Dept: FAMILY MEDICINE CLINIC | Facility: CLINIC | Age: 62
End: 2022-10-28
Payer: COMMERCIAL

## 2022-10-28 ENCOUNTER — LAB ENCOUNTER (OUTPATIENT)
Dept: LAB | Age: 62
End: 2022-10-28
Attending: FAMILY MEDICINE
Payer: COMMERCIAL

## 2022-10-28 ENCOUNTER — EKG ENCOUNTER (OUTPATIENT)
Dept: LAB | Age: 62
End: 2022-10-28
Attending: FAMILY MEDICINE
Payer: COMMERCIAL

## 2022-10-28 VITALS
BODY MASS INDEX: 27.06 KG/M2 | DIASTOLIC BLOOD PRESSURE: 84 MMHG | HEIGHT: 70 IN | HEART RATE: 72 BPM | OXYGEN SATURATION: 98 % | RESPIRATION RATE: 16 BRPM | WEIGHT: 189 LBS | SYSTOLIC BLOOD PRESSURE: 110 MMHG

## 2022-10-28 DIAGNOSIS — Z13.0 SCREENING FOR DEFICIENCY ANEMIA: ICD-10-CM

## 2022-10-28 DIAGNOSIS — Z12.5 PROSTATE CANCER SCREENING: ICD-10-CM

## 2022-10-28 DIAGNOSIS — Z01.818 PREOP EXAMINATION: ICD-10-CM

## 2022-10-28 DIAGNOSIS — Z13.220 LIPID SCREENING: ICD-10-CM

## 2022-10-28 DIAGNOSIS — Z00.00 WELLNESS EXAMINATION: ICD-10-CM

## 2022-10-28 DIAGNOSIS — M19.012 OSTEOARTHRITIS OF LEFT SHOULDER, UNSPECIFIED OSTEOARTHRITIS TYPE: ICD-10-CM

## 2022-10-28 DIAGNOSIS — Z00.00 WELLNESS EXAMINATION: Primary | ICD-10-CM

## 2022-10-28 DIAGNOSIS — Z01.818 PRE-OP EXAM: Primary | ICD-10-CM

## 2022-10-28 LAB
ALBUMIN SERPL-MCNC: 3.8 G/DL (ref 3.4–5)
ALBUMIN/GLOB SERPL: 1.3 {RATIO} (ref 1–2)
ALP LIVER SERPL-CCNC: 54 U/L
ALT SERPL-CCNC: 22 U/L
ANION GAP SERPL CALC-SCNC: 6 MMOL/L (ref 0–18)
AST SERPL-CCNC: 17 U/L (ref 15–37)
ATRIAL RATE: 59 BPM
BASOPHILS # BLD AUTO: 0.03 X10(3) UL (ref 0–0.2)
BASOPHILS NFR BLD AUTO: 0.6 %
BILIRUB SERPL-MCNC: 1.2 MG/DL (ref 0.1–2)
BUN BLD-MCNC: 21 MG/DL (ref 7–18)
CALCIUM BLD-MCNC: 9.2 MG/DL (ref 8.5–10.1)
CHLORIDE SERPL-SCNC: 106 MMOL/L (ref 98–112)
CHOLEST SERPL-MCNC: 173 MG/DL (ref ?–200)
CO2 SERPL-SCNC: 28 MMOL/L (ref 21–32)
COMPLEXED PSA SERPL-MCNC: 1.27 NG/ML (ref ?–4)
CREAT BLD-MCNC: 1.44 MG/DL
EOSINOPHIL # BLD AUTO: 0.05 X10(3) UL (ref 0–0.7)
EOSINOPHIL NFR BLD AUTO: 1 %
ERYTHROCYTE [DISTWIDTH] IN BLOOD BY AUTOMATED COUNT: 12.2 %
FASTING PATIENT LIPID ANSWER: YES
FASTING STATUS PATIENT QL REPORTED: YES
GFR SERPLBLD BASED ON 1.73 SQ M-ARVRAT: 56 ML/MIN/1.73M2 (ref 60–?)
GLOBULIN PLAS-MCNC: 3 G/DL (ref 2.8–4.4)
GLUCOSE BLD-MCNC: 107 MG/DL (ref 70–99)
HCT VFR BLD AUTO: 46.7 %
HDLC SERPL-MCNC: 60 MG/DL (ref 40–59)
HGB BLD-MCNC: 15.6 G/DL
IMM GRANULOCYTES # BLD AUTO: 0.01 X10(3) UL (ref 0–1)
IMM GRANULOCYTES NFR BLD: 0.2 %
LDLC SERPL CALC-MCNC: 100 MG/DL (ref ?–100)
LYMPHOCYTES # BLD AUTO: 1.55 X10(3) UL (ref 1–4)
LYMPHOCYTES NFR BLD AUTO: 29.5 %
MCH RBC QN AUTO: 32 PG (ref 26–34)
MCHC RBC AUTO-ENTMCNC: 33.4 G/DL (ref 31–37)
MCV RBC AUTO: 95.7 FL
MONOCYTES # BLD AUTO: 0.32 X10(3) UL (ref 0.1–1)
MONOCYTES NFR BLD AUTO: 6.1 %
NEUTROPHILS # BLD AUTO: 3.29 X10 (3) UL (ref 1.5–7.7)
NEUTROPHILS # BLD AUTO: 3.29 X10(3) UL (ref 1.5–7.7)
NEUTROPHILS NFR BLD AUTO: 62.6 %
NONHDLC SERPL-MCNC: 113 MG/DL (ref ?–130)
OSMOLALITY SERPL CALC.SUM OF ELEC: 293 MOSM/KG (ref 275–295)
P AXIS: 40 DEGREES
P-R INTERVAL: 204 MS
PLATELET # BLD AUTO: 275 10(3)UL (ref 150–450)
POTASSIUM SERPL-SCNC: 4.3 MMOL/L (ref 3.5–5.1)
PROT SERPL-MCNC: 6.8 G/DL (ref 6.4–8.2)
Q-T INTERVAL: 444 MS
QRS DURATION: 88 MS
QTC CALCULATION (BEZET): 439 MS
R AXIS: 68 DEGREES
RBC # BLD AUTO: 4.88 X10(6)UL
SODIUM SERPL-SCNC: 140 MMOL/L (ref 136–145)
T AXIS: 56 DEGREES
TRIGL SERPL-MCNC: 71 MG/DL (ref 30–149)
VENTRICULAR RATE: 59 BPM
VLDLC SERPL CALC-MCNC: 12 MG/DL (ref 0–30)
WBC # BLD AUTO: 5.3 X10(3) UL (ref 4–11)

## 2022-10-28 PROCEDURE — 3008F BODY MASS INDEX DOCD: CPT | Performed by: FAMILY MEDICINE

## 2022-10-28 PROCEDURE — 90471 IMMUNIZATION ADMIN: CPT | Performed by: FAMILY MEDICINE

## 2022-10-28 PROCEDURE — 80061 LIPID PANEL: CPT | Performed by: FAMILY MEDICINE

## 2022-10-28 PROCEDURE — 90686 IIV4 VACC NO PRSV 0.5 ML IM: CPT | Performed by: FAMILY MEDICINE

## 2022-10-28 PROCEDURE — 3074F SYST BP LT 130 MM HG: CPT | Performed by: FAMILY MEDICINE

## 2022-10-28 PROCEDURE — 3079F DIAST BP 80-89 MM HG: CPT | Performed by: FAMILY MEDICINE

## 2022-10-28 PROCEDURE — 99213 OFFICE O/P EST LOW 20 MIN: CPT | Performed by: FAMILY MEDICINE

## 2022-10-28 PROCEDURE — 84153 ASSAY OF PSA TOTAL: CPT | Performed by: FAMILY MEDICINE

## 2022-10-28 PROCEDURE — 85025 COMPLETE CBC W/AUTO DIFF WBC: CPT | Performed by: FAMILY MEDICINE

## 2022-10-28 PROCEDURE — 80053 COMPREHEN METABOLIC PANEL: CPT | Performed by: FAMILY MEDICINE

## 2022-10-28 PROCEDURE — 99396 PREV VISIT EST AGE 40-64: CPT | Performed by: FAMILY MEDICINE

## 2022-10-28 PROCEDURE — 93005 ELECTROCARDIOGRAM TRACING: CPT

## 2022-10-28 PROCEDURE — 93010 ELECTROCARDIOGRAM REPORT: CPT | Performed by: INTERNAL MEDICINE

## 2022-11-03 ENCOUNTER — PATIENT MESSAGE (OUTPATIENT)
Dept: FAMILY MEDICINE CLINIC | Facility: CLINIC | Age: 62
End: 2022-11-03

## 2022-11-06 DIAGNOSIS — E78.2 MIXED HYPERLIPIDEMIA: ICD-10-CM

## 2022-11-07 RX ORDER — PRAVASTATIN SODIUM 20 MG
20 TABLET ORAL NIGHTLY
Qty: 90 TABLET | Refills: 0 | Status: SHIPPED | OUTPATIENT
Start: 2022-11-07

## 2022-11-25 ENCOUNTER — LAB ENCOUNTER (OUTPATIENT)
Dept: LAB | Age: 62
End: 2022-11-25
Attending: FAMILY MEDICINE
Payer: COMMERCIAL

## 2022-11-25 DIAGNOSIS — Z01.818 PREOP EXAMINATION: ICD-10-CM

## 2022-11-26 LAB — SARS-COV-2 RNA RESP QL NAA+PROBE: NOT DETECTED

## 2023-02-08 DIAGNOSIS — E78.2 MIXED HYPERLIPIDEMIA: ICD-10-CM

## 2023-02-08 RX ORDER — PRAVASTATIN SODIUM 20 MG
20 TABLET ORAL NIGHTLY
Qty: 90 TABLET | Refills: 0 | Status: SHIPPED | OUTPATIENT
Start: 2023-02-08

## 2023-04-22 RX ORDER — VALACYCLOVIR HYDROCHLORIDE 1 G/1
TABLET, FILM COATED ORAL
Qty: 20 TABLET | Refills: 0 | Status: SHIPPED | OUTPATIENT
Start: 2023-04-22

## 2023-05-23 DIAGNOSIS — E78.2 MIXED HYPERLIPIDEMIA: ICD-10-CM

## 2023-05-23 RX ORDER — PRAVASTATIN SODIUM 20 MG
20 TABLET ORAL NIGHTLY
Qty: 90 TABLET | Refills: 0 | Status: SHIPPED | OUTPATIENT
Start: 2023-05-23

## 2023-08-07 RX ORDER — VALACYCLOVIR HYDROCHLORIDE 1 G/1
TABLET, FILM COATED ORAL
Qty: 20 TABLET | Refills: 0 | Status: SHIPPED | OUTPATIENT
Start: 2023-08-07

## 2023-08-30 DIAGNOSIS — E78.2 MIXED HYPERLIPIDEMIA: ICD-10-CM

## 2023-08-30 RX ORDER — PRAVASTATIN SODIUM 20 MG
20 TABLET ORAL NIGHTLY
Qty: 90 TABLET | Refills: 0 | Status: SHIPPED | OUTPATIENT
Start: 2023-08-30

## 2023-10-30 ENCOUNTER — OFFICE VISIT (OUTPATIENT)
Dept: FAMILY MEDICINE CLINIC | Facility: CLINIC | Age: 63
End: 2023-10-30

## 2023-10-30 ENCOUNTER — LAB ENCOUNTER (OUTPATIENT)
Dept: LAB | Age: 63
End: 2023-10-30
Attending: FAMILY MEDICINE
Payer: COMMERCIAL

## 2023-10-30 VITALS
SYSTOLIC BLOOD PRESSURE: 110 MMHG | HEIGHT: 70 IN | OXYGEN SATURATION: 98 % | HEART RATE: 72 BPM | RESPIRATION RATE: 16 BRPM | DIASTOLIC BLOOD PRESSURE: 66 MMHG | WEIGHT: 190 LBS | BODY MASS INDEX: 27.2 KG/M2

## 2023-10-30 DIAGNOSIS — Z13.220 LIPID SCREENING: ICD-10-CM

## 2023-10-30 DIAGNOSIS — Z12.5 PROSTATE CANCER SCREENING: ICD-10-CM

## 2023-10-30 DIAGNOSIS — Z13.0 SCREENING FOR DEFICIENCY ANEMIA: ICD-10-CM

## 2023-10-30 DIAGNOSIS — Z00.00 WELLNESS EXAMINATION: ICD-10-CM

## 2023-10-30 DIAGNOSIS — Z00.00 WELLNESS EXAMINATION: Primary | ICD-10-CM

## 2023-10-30 LAB
ALBUMIN SERPL-MCNC: 3.9 G/DL (ref 3.4–5)
ALBUMIN/GLOB SERPL: 1.1 {RATIO} (ref 1–2)
ALP LIVER SERPL-CCNC: 57 U/L
ALT SERPL-CCNC: 25 U/L
ANION GAP SERPL CALC-SCNC: 3 MMOL/L (ref 0–18)
AST SERPL-CCNC: 15 U/L (ref 15–37)
BASOPHILS # BLD AUTO: 0.03 X10(3) UL (ref 0–0.2)
BASOPHILS NFR BLD AUTO: 0.5 %
BILIRUB SERPL-MCNC: 0.7 MG/DL (ref 0.1–2)
BUN BLD-MCNC: 22 MG/DL (ref 7–18)
CALCIUM BLD-MCNC: 9.3 MG/DL (ref 8.5–10.1)
CHLORIDE SERPL-SCNC: 108 MMOL/L (ref 98–112)
CHOLEST SERPL-MCNC: 167 MG/DL (ref ?–200)
CO2 SERPL-SCNC: 29 MMOL/L (ref 21–32)
COMPLEXED PSA SERPL-MCNC: 1.39 NG/ML (ref ?–4)
CREAT BLD-MCNC: 1.29 MG/DL
EGFRCR SERPLBLD CKD-EPI 2021: 63 ML/MIN/1.73M2 (ref 60–?)
EOSINOPHIL # BLD AUTO: 0.06 X10(3) UL (ref 0–0.7)
EOSINOPHIL NFR BLD AUTO: 1.1 %
ERYTHROCYTE [DISTWIDTH] IN BLOOD BY AUTOMATED COUNT: 12.4 %
FASTING PATIENT LIPID ANSWER: YES
FASTING STATUS PATIENT QL REPORTED: YES
GLOBULIN PLAS-MCNC: 3.4 G/DL (ref 2.8–4.4)
GLUCOSE BLD-MCNC: 111 MG/DL (ref 70–99)
HCT VFR BLD AUTO: 45 %
HDLC SERPL-MCNC: 68 MG/DL (ref 40–59)
HGB BLD-MCNC: 15.2 G/DL
IMM GRANULOCYTES # BLD AUTO: 0.03 X10(3) UL (ref 0–1)
IMM GRANULOCYTES NFR BLD: 0.5 %
LDLC SERPL CALC-MCNC: 87 MG/DL (ref ?–100)
LYMPHOCYTES # BLD AUTO: 1.66 X10(3) UL (ref 1–4)
LYMPHOCYTES NFR BLD AUTO: 29.8 %
MCH RBC QN AUTO: 31 PG (ref 26–34)
MCHC RBC AUTO-ENTMCNC: 33.8 G/DL (ref 31–37)
MCV RBC AUTO: 91.6 FL
MONOCYTES # BLD AUTO: 0.35 X10(3) UL (ref 0.1–1)
MONOCYTES NFR BLD AUTO: 6.3 %
NEUTROPHILS # BLD AUTO: 3.44 X10 (3) UL (ref 1.5–7.7)
NEUTROPHILS # BLD AUTO: 3.44 X10(3) UL (ref 1.5–7.7)
NEUTROPHILS NFR BLD AUTO: 61.8 %
NONHDLC SERPL-MCNC: 99 MG/DL (ref ?–130)
OSMOLALITY SERPL CALC.SUM OF ELEC: 294 MOSM/KG (ref 275–295)
PLATELET # BLD AUTO: 298 10(3)UL (ref 150–450)
POTASSIUM SERPL-SCNC: 4.7 MMOL/L (ref 3.5–5.1)
PROT SERPL-MCNC: 7.3 G/DL (ref 6.4–8.2)
RBC # BLD AUTO: 4.91 X10(6)UL
SODIUM SERPL-SCNC: 140 MMOL/L (ref 136–145)
TRIGL SERPL-MCNC: 60 MG/DL (ref 30–149)
VLDLC SERPL CALC-MCNC: 10 MG/DL (ref 0–30)
WBC # BLD AUTO: 5.6 X10(3) UL (ref 4–11)

## 2023-10-30 PROCEDURE — 90471 IMMUNIZATION ADMIN: CPT | Performed by: FAMILY MEDICINE

## 2023-10-30 PROCEDURE — 80053 COMPREHEN METABOLIC PANEL: CPT | Performed by: FAMILY MEDICINE

## 2023-10-30 PROCEDURE — 3078F DIAST BP <80 MM HG: CPT | Performed by: FAMILY MEDICINE

## 2023-10-30 PROCEDURE — 99396 PREV VISIT EST AGE 40-64: CPT | Performed by: FAMILY MEDICINE

## 2023-10-30 PROCEDURE — 90686 IIV4 VACC NO PRSV 0.5 ML IM: CPT | Performed by: FAMILY MEDICINE

## 2023-10-30 PROCEDURE — 85025 COMPLETE CBC W/AUTO DIFF WBC: CPT | Performed by: FAMILY MEDICINE

## 2023-10-30 PROCEDURE — 3008F BODY MASS INDEX DOCD: CPT | Performed by: FAMILY MEDICINE

## 2023-10-30 PROCEDURE — 84153 ASSAY OF PSA TOTAL: CPT | Performed by: FAMILY MEDICINE

## 2023-10-30 PROCEDURE — 80061 LIPID PANEL: CPT | Performed by: FAMILY MEDICINE

## 2023-10-30 PROCEDURE — 3074F SYST BP LT 130 MM HG: CPT | Performed by: FAMILY MEDICINE

## 2023-12-01 DIAGNOSIS — E78.2 MIXED HYPERLIPIDEMIA: ICD-10-CM

## 2023-12-01 RX ORDER — PRAVASTATIN SODIUM 20 MG
20 TABLET ORAL NIGHTLY
Qty: 90 TABLET | Refills: 0 | Status: SHIPPED | OUTPATIENT
Start: 2023-12-01

## 2024-01-08 RX ORDER — VALACYCLOVIR HYDROCHLORIDE 1 G/1
1000 TABLET, FILM COATED ORAL 2 TIMES DAILY
Qty: 20 TABLET | Refills: 0 | Status: SHIPPED | OUTPATIENT
Start: 2024-01-08

## 2024-03-02 DIAGNOSIS — E78.2 MIXED HYPERLIPIDEMIA: ICD-10-CM

## 2024-03-04 RX ORDER — PRAVASTATIN SODIUM 20 MG
20 TABLET ORAL NIGHTLY
Qty: 90 TABLET | Refills: 0 | Status: SHIPPED | OUTPATIENT
Start: 2024-03-04

## 2024-03-04 RX ORDER — VALACYCLOVIR HYDROCHLORIDE 1 G/1
1000 TABLET, FILM COATED ORAL 2 TIMES DAILY
Qty: 20 TABLET | Refills: 0 | Status: SHIPPED | OUTPATIENT
Start: 2024-03-04

## 2024-03-15 ENCOUNTER — ORDER TRANSCRIPTION (OUTPATIENT)
Dept: ADMINISTRATIVE | Facility: HOSPITAL | Age: 64
End: 2024-03-15

## 2024-03-15 ENCOUNTER — OFFICE VISIT (OUTPATIENT)
Dept: FAMILY MEDICINE CLINIC | Facility: CLINIC | Age: 64
End: 2024-03-15
Payer: COMMERCIAL

## 2024-03-15 VITALS
DIASTOLIC BLOOD PRESSURE: 62 MMHG | WEIGHT: 190 LBS | OXYGEN SATURATION: 97 % | BODY MASS INDEX: 27.2 KG/M2 | RESPIRATION RATE: 15 BRPM | HEART RATE: 61 BPM | SYSTOLIC BLOOD PRESSURE: 118 MMHG | HEIGHT: 70 IN

## 2024-03-15 DIAGNOSIS — Z13.6 SCREENING FOR HEART DISEASE: Primary | ICD-10-CM

## 2024-03-15 DIAGNOSIS — R19.09 MASS OF RIGHT INGUINAL REGION: Primary | ICD-10-CM

## 2024-03-15 PROCEDURE — 3078F DIAST BP <80 MM HG: CPT | Performed by: FAMILY MEDICINE

## 2024-03-15 PROCEDURE — 99213 OFFICE O/P EST LOW 20 MIN: CPT | Performed by: FAMILY MEDICINE

## 2024-03-15 PROCEDURE — 3008F BODY MASS INDEX DOCD: CPT | Performed by: FAMILY MEDICINE

## 2024-03-15 PROCEDURE — 3074F SYST BP LT 130 MM HG: CPT | Performed by: FAMILY MEDICINE

## 2024-03-15 NOTE — PROGRESS NOTES
[facility] Progress Note    SUBJECTIVE: Trey Aguilar 63 year old male is here today for   Chief Complaint   Patient presents with    Groin Pain     Not really pain more of discomfort when he plays hockey-for almost a year -denies any other symptoms        Swelling and discomfort on right inguinal area when playing hockey, also noted with ejaculation.    Not as cognizant of it without playing hockey as regularly.    PMH  Past Medical History:   Diagnosis Date    Abdominal pain     Acute pain due to trauma 6/2014    Arthritis     Arthritis of left shoulder region     Cervicalgia 7/25/2011    Chest discomfort 6/2014    Creatinine elevation 3/2014    Decorative tattoo     Finger injury 6/2012    Hemorrhoids 2012    Hyperlipidemia     Maxillary sinus cyst 9/2012    2 cm mucous retention cyst Rt maxillary sinus    Neck pain 12/2008    w/ disc degeneration & narrowing of C3-C7 discs & small focal disc herniation at C4    Shingles 2009    Shoulder dislocation 2002    Rt    Unspecified disorders of bursae and tendons in shoulder region 7/25/2011        PSH  Past Surgical History:   Procedure Laterality Date    COLONOSCOPY      ELECTROCARDIOGRAM, COMPLETE  8/28/2003    OTHER SURGICAL HISTORY  11/28/2012    endoscopy        Social Hx:  No changes    ROS  See HPI    OBJECTIVE:  /62   Pulse 61   Resp 15   Ht 5' 10\" (1.778 m)   Wt 190 lb (86.2 kg)   SpO2 97%   BMI 27.26 kg/m²       Ext:FROM at hips, no pain, on right inguinal area with cough more pronounced mass than on left      Labs:          Meds:   Current Outpatient Medications   Medication Sig Dispense Refill    valACYclovir 1 G Oral Tab Take 1 tablet (1,000 mg total) by mouth 2 (two) times daily. 20 tablet 0    pravastatin 20 MG Oral Tab Take 1 tablet (20 mg total) by mouth nightly. 90 tablet 0    TADALAFIL 20 MG Oral Tab TAKE ONE TABLET BY MOUTH DAILY AS NEEDED FOR ERECTILE DYSFUNCTION 12 tablet 0         Assessment/Plan  Emo was seen today for  groin pain.    Diagnoses and all orders for this visit:    Mass of right inguinal region  -     US ABDOMEN LIMITED (CPT=76705); Future         Will get US to evaluate further, consider CT if not found, as strong suspicion of hernia,         Total Time spent with patient and coordinating care:  20 minutes.    Follow up: as needed      Yogesh Ulrich MD

## 2024-04-04 ENCOUNTER — HOSPITAL ENCOUNTER (OUTPATIENT)
Dept: CT IMAGING | Age: 64
Discharge: HOME | End: 2024-04-04
Attending: FAMILY MEDICINE

## 2024-04-04 VITALS
SYSTOLIC BLOOD PRESSURE: 124 MMHG | BODY MASS INDEX: 27.2 KG/M2 | DIASTOLIC BLOOD PRESSURE: 72 MMHG | WEIGHT: 190 LBS | HEIGHT: 70 IN

## 2024-04-04 DIAGNOSIS — Z13.6 SCREENING FOR HEART DISEASE: ICD-10-CM

## 2024-04-04 NOTE — PROGRESS NOTES
Date of Service 4/4/2024    HALIE QUINTANILLA  Date of Birth 8/8/1960    Patient Age: 63 year old    PCP: Yogesh Ulrich MD  [address] [place] [address]  [address]  [facility] [address]    Heart Scan Consult  Preliminary Heart Scan Score: 126    Previous Screening  Heart Scan Completed Previously: No        Peripheral Vascular Scan Completed Previously: No          Risk Factors  Personal Risk Factors  Non-alterable Risk Factors: Family History (Father had triple bypass surgery and mother had high cholesterol.)  Alterable Risk Factors: Smoking;Abnormal Cholesterol (Patient smokes cigar occasionally.)      Body Mass Index  Body mass index is 27.26 kg/m².    Blood Pressure     /72 (BP Location: Left arm)     (Normal =< 120/80,  Elevated = 120-129/ >80,  High Stage1 130-139/80-89 , Stage2 >140/>90)    Lipid Profile  Cholesterol: 167, done on 10/30/2023.  HDL Cholesterol: 68, done on 10/30/2023.  LDL Cholesterol: 87, done on 10/30/2023.  TriGlycerides 60, done on 10/30/2023.    Cholesterol Goals  Value   Total  =< 200   HDL  = > 45 Men = > 55 Women   LDL   =< 100   Triglycerides  =< 150       Glucose and Hemoglobin A1C  Lab Results   Component Value Date     (H) 10/30/2023     (Normal Fasting Glucose < 100mg/dl )    Nurse Review  Risk factor information and results reviewed with Nurse: Yes    Recommended Follow Up:  Consult your physician regarding:: Final Heart Scan Report;Discuss potential for Incidental Finding;Discuss Potential for Score Variance;Cholesterol Results (Non-Fasting);Glucose (Fasting)      Recommendations for Change:  Nutrition Changes: Low Saturated Fat;Low Fat Dairy;Low Salt Eating;Increase Fiber (Diet is filled with chicken, fish and some cheese on pizza. Little red meat.) Encouraged to limit carbohydrates including sugary fruits like melons. Try berries.    Cholesterol Modification (goal of therapy depends upon your risk): No Change Needed (Patient compliant on statin.)    Exercise: No Change Needed  (Exercises 3-4/week both cardio and weight training. Keep up the good job.)         Weight Management: Decrease Current Weight         Repeat Heart Scan: 3 Years if Calcium Score is > 0.0;Discuss with your Physician              [facility] Recommended Resources:  Recommended Resources: Upcoming Classes, Medical Services and Health Library [email or web address]            Darby PETERSON RN        Please Contact the Nurse Heart Line with any Questions or Concerns [phone].

## 2024-04-08 ENCOUNTER — HOSPITAL ENCOUNTER (OUTPATIENT)
Dept: ULTRASOUND IMAGING | Age: 64
Discharge: HOME | End: 2024-04-08
Attending: FAMILY MEDICINE
Payer: COMMERCIAL

## 2024-04-08 DIAGNOSIS — R19.09 MASS OF RIGHT INGUINAL REGION: ICD-10-CM

## 2024-04-08 PROCEDURE — 76705 ECHO EXAM OF ABDOMEN: CPT | Performed by: FAMILY MEDICINE

## 2024-05-02 ENCOUNTER — OFFICE VISIT (OUTPATIENT)
Facility: LOCATION | Age: 64
End: 2024-05-02
Payer: COMMERCIAL

## 2024-05-02 VITALS — HEART RATE: 75 BPM | TEMPERATURE: 97 F

## 2024-05-02 DIAGNOSIS — K40.90 RIGHT INGUINAL HERNIA: Primary | ICD-10-CM

## 2024-05-02 PROCEDURE — 99244 OFF/OP CNSLTJ NEW/EST MOD 40: CPT | Performed by: SURGERY

## 2024-05-02 NOTE — H&P
New Patient Visit Note       Active Problems      1. Right inguinal hernia        Chief Complaint   Chief Complaint   Patient presents with    New Patient     NP- Right inguinal hernia, pt. Denies bulging Pt. States feels discomfort at times.        History of Present Illness     The patient presents to request of his primary care physician for evaluation of right inguinal pain and a right inguinal hernia.  Recently the patient noted discomfort in the right inguinal region and ultrasound ordered by his primary care physician confirms a sliding right inguinal hernia more prominent with Valsalva.  I reviewed the images and I concur with the interpretation.  I discussed findings with the patient in context of his presenting symptoms.  The patient has no other bulges or discomfort and there is no discomfort remote from the inguinal region.  No other complaints offered.    The patient denies fever, chills, chest pain, shortness of breath, dyspnea. The patient also denies hematemesis, melena, or hematochezia. The patient denies change in bowel or bladder habits. There is no complaint of hematuria or dysuria.    I discussed the risk, benefits, alternatives of surgery.  I discussed the anticipated postoperative recovery including dietary, activity, exercise, and lifestyle recommendations.  The patient's questions regarding surgical procedure were answered and the patient voiced understanding of the care plan.    Allergies  Trey is allergic to penicillins.    Past Medical / Surgical / Social / Family History    The past medical and past surgical history have been reviewed by me today.    Past Medical History:    Abdominal pain    Acute pain due to trauma    Arthritis    Arthritis of left shoulder region    Cervicalgia    Chest discomfort    Creatinine elevation    Decorative tattoo    Finger injury    Hemorrhoids    Hyperlipidemia    Maxillary sinus cyst    2 cm mucous retention cyst Rt maxillary sinus    Neck pain    w/  disc degeneration & narrowing of C3-C7 discs & small focal disc herniation at C4    Shingles    Shoulder dislocation    Rt    Unspecified disorders of bursae and tendons in shoulder region     Past Surgical History:   Procedure Laterality Date    Colonoscopy      Electrocardiogram, complete  8/28/2003    Other surgical history  11/28/2012    endoscopy       The family history and social history have been reviewed by me today.    Family History   Problem Relation Age of Onset    Lipids Father         hyperlipidemia    Arthritis Father         OA    Hypertension Father     Heart Disease Father         CAD    Heart Surgery Father         CABG    Lipids Mother         hyperlipidemia    Other (Alzheimers) Sister     Other (autistic) Son      Social History     Socioeconomic History    Marital status:    Tobacco Use    Smoking status: Former     Types: Cigars    Smokeless tobacco: Never   Vaping Use    Vaping status: Never Used   Substance and Sexual Activity    Alcohol use: Yes     Comment: SOC    Drug use: No        Current Outpatient Medications:     valACYclovir 1 G Oral Tab, Take 1 tablet (1,000 mg total) by mouth 2 (two) times daily., Disp: 20 tablet, Rfl: 0    pravastatin 20 MG Oral Tab, Take 1 tablet (20 mg total) by mouth nightly., Disp: 90 tablet, Rfl: 0    TADALAFIL 20 MG Oral Tab, TAKE ONE TABLET BY MOUTH DAILY AS NEEDED FOR ERECTILE DYSFUNCTION, Disp: 12 tablet, Rfl: 0      Review of Systems  The Review of Systems has been reviewed by me during today.  Review of Systems   Constitutional: Negative.    HENT: Negative.     Eyes: Negative.    Respiratory: Negative.     Cardiovascular: Negative.    Gastrointestinal: Negative.    Genitourinary: Negative.    Musculoskeletal: Negative.    Skin: Negative.    Neurological: Negative.    Psychiatric/Behavioral: Negative.         Physical Findings   Pulse 75   Temp 97.3 °F (36.3 °C) (Temporal)   Physical Exam  Vitals and nursing note reviewed.   Constitutional:        General: He is not in acute distress.     Appearance: He is well-developed.   HENT:      Head: Normocephalic and atraumatic.   Eyes:      General: No scleral icterus.     Pupils: Pupils are equal, round, and reactive to light.   Neck:      Vascular: No JVD.      Trachea: No tracheal deviation.   Cardiovascular:      Rate and Rhythm: Normal rate and regular rhythm.   Pulmonary:      Effort: Pulmonary effort is normal. No respiratory distress.      Breath sounds: No stridor.   Abdominal:      General: Bowel sounds are normal. There is no distension.      Palpations: Abdomen is soft. Abdomen is not rigid. There is no mass.      Tenderness: There is no abdominal tenderness. There is no guarding or rebound. Negative signs include Faye's sign and McBurney's sign.      Hernia: A hernia is present. Hernia is present in the right inguinal area. There is no hernia in the left inguinal area.   Genitourinary:     Penis: Normal. No phimosis, paraphimosis, hypospadias, erythema, tenderness, discharge, swelling or lesions.       Testes: Normal.         Right: Mass, tenderness, swelling, testicular hydrocele or varicocele not present. Right testis is descended. Cremasteric reflex is present.          Left: Mass, tenderness, swelling, testicular hydrocele or varicocele not present. Left testis is descended. Cremasteric reflex is present.       Epididymis:      Right: Normal.      Left: Normal.       Musculoskeletal:         General: Normal range of motion.      Cervical back: Normal range of motion and neck supple.   Skin:     General: Skin is warm and dry.   Neurological:      Mental Status: He is alert and oriented to person, place, and time.   Psychiatric:         Behavior: Behavior normal.             Assessment   1. Right inguinal hernia          Plan     The patient will be scheduled for robotic repair of right inguinal hernia with mesh.    The deepak-operative care plan was discussed with the patient, who voices  understanding.  Activity and lifting recommendations were discussed in length.     The risks, benefits, and alternatives to the procedure were explained to the patient.  The risks explained include, but are not limited to, bleeding, infection, pain wound complications, recurrence, incorrect diagnosis, injury to adjacent organs and structures. We also discussed the possibile need for further therapeutic, diagnostic, or surgical intervention.  The patient voiced understanding, and after all questions were answered to the patient's satisfaction, the patient provided willing and informed consent to proceed.     No orders of the defined types were placed in this encounter.      Imaging & Referrals   None    Follow Up  No follow-ups on file.    Shon López MD

## 2024-05-09 ENCOUNTER — TELEPHONE (OUTPATIENT)
Facility: LOCATION | Age: 64
End: 2024-05-09

## 2024-05-09 DIAGNOSIS — K40.90 UNILATERAL INGUINAL HERNIA WITHOUT OBSTRUCTION OR GANGRENE, RECURRENCE NOT SPECIFIED: Primary | ICD-10-CM

## 2024-05-20 RX ORDER — VALACYCLOVIR HYDROCHLORIDE 1 G/1
1000 TABLET, FILM COATED ORAL 2 TIMES DAILY
Qty: 20 TABLET | Refills: 0 | Status: SHIPPED | OUTPATIENT
Start: 2024-05-20

## 2024-06-14 DIAGNOSIS — E78.2 MIXED HYPERLIPIDEMIA: ICD-10-CM

## 2024-06-14 RX ORDER — PRAVASTATIN SODIUM 20 MG
20 TABLET ORAL NIGHTLY
Qty: 90 TABLET | Refills: 0 | Status: SHIPPED | OUTPATIENT
Start: 2024-06-14

## 2024-07-15 RX ORDER — VALACYCLOVIR HYDROCHLORIDE 1 G/1
1000 TABLET, FILM COATED ORAL 2 TIMES DAILY
Qty: 20 TABLET | Refills: 0 | Status: SHIPPED | OUTPATIENT
Start: 2024-07-15

## 2024-08-15 RX ORDER — VALACYCLOVIR HYDROCHLORIDE 1 G/1
1000 TABLET, FILM COATED ORAL 2 TIMES DAILY
Qty: 20 TABLET | Refills: 0 | Status: SHIPPED | OUTPATIENT
Start: 2024-08-15

## 2024-09-19 DIAGNOSIS — E78.2 MIXED HYPERLIPIDEMIA: ICD-10-CM

## 2024-09-19 RX ORDER — PRAVASTATIN SODIUM 20 MG
20 TABLET ORAL NIGHTLY
Qty: 90 TABLET | Refills: 0 | Status: SHIPPED | OUTPATIENT
Start: 2024-09-19

## 2024-09-30 ENCOUNTER — TELEPHONE (OUTPATIENT)
Facility: LOCATION | Age: 64
End: 2024-09-30

## 2024-09-30 NOTE — TELEPHONE ENCOUNTER
HALIE QUINTANILLA Patient  Member ID  [id number]    Date of Birth  1960-08-08    Gender  Male    Transaction Type  Outpatient Authorization    Organization  [facility]    Payer  [facility] logo     Certificate Information  Reference Number  [id number]    Status  NO ACTION REQUIRED    Message  Requested Service does not require preauthorization. We would strongly encourage you to check benefits for this service.    Member Information  Patient Name  HALIE QUINTANILLA    Patient Date of Birth  1960-08-08    Patient Gender  Male    Member ID  [id number]    Relationship to Subscriber  Self    Subscriber Name  HALIE QUINTANILLA    Requesting Provider     Name  ALESIA HUI    NPI  [id number]    Tax Id  [id number]    Specialty  [id number]    Provider Role  Provider    Address  [address] [id number]    Phone  [phone]    Fax  [phone]    Contact Name  BURAK SEBASTIAN    Service Information  Service Type  2 - Surgical    Place of Service  22 - [facility]    Service From - To Date  2024-10-14 - 2024-12-31    Level of Service  Elective    Diagnosis Code 1   - Unil inguinal hernia w/o obst or gangr not spcf as recur    Procedure Code 1 (CPT/HCPCS)  84166 - LAP ING HERNIA REPAIR INIT    Quantity  1 Units    Status  NO ACTION REQUIRED

## 2024-10-13 ENCOUNTER — ANESTHESIA EVENT (OUTPATIENT)
Dept: SURGERY | Facility: HOSPITAL | Age: 64
End: 2024-10-13
Payer: COMMERCIAL

## 2024-10-14 ENCOUNTER — ANESTHESIA (OUTPATIENT)
Dept: SURGERY | Facility: HOSPITAL | Age: 64
End: 2024-10-14
Payer: COMMERCIAL

## 2024-10-14 ENCOUNTER — HOSPITAL ENCOUNTER (OUTPATIENT)
Facility: HOSPITAL | Age: 64
Setting detail: HOSPITAL OUTPATIENT SURGERY
Discharge: HOME | End: 2024-10-14
Attending: SURGERY | Admitting: SURGERY
Payer: COMMERCIAL

## 2024-10-14 VITALS
DIASTOLIC BLOOD PRESSURE: 85 MMHG | WEIGHT: 182 LBS | OXYGEN SATURATION: 97 % | HEART RATE: 62 BPM | TEMPERATURE: 97 F | BODY MASS INDEX: 26.05 KG/M2 | RESPIRATION RATE: 18 BRPM | SYSTOLIC BLOOD PRESSURE: 140 MMHG | HEIGHT: 70 IN

## 2024-10-14 DIAGNOSIS — K40.90 RIGHT INGUINAL HERNIA: Primary | ICD-10-CM

## 2024-10-14 PROCEDURE — 49650 LAP ING HERNIA REPAIR INIT: CPT

## 2024-10-14 PROCEDURE — 8E0W4CZ ROBOTIC ASSISTED PROCEDURE OF TRUNK REGION, PERCUTANEOUS ENDOSCOPIC APPROACH: ICD-10-PCS | Performed by: SURGERY

## 2024-10-14 PROCEDURE — 0YU54JZ SUPPLEMENT RIGHT INGUINAL REGION WITH SYNTHETIC SUBSTITUTE, PERCUTANEOUS ENDOSCOPIC APPROACH: ICD-10-PCS | Performed by: SURGERY

## 2024-10-14 PROCEDURE — 49650 LAP ING HERNIA REPAIR INIT: CPT | Performed by: SURGERY

## 2024-10-14 DEVICE — LAPAROSCOPIC SELF-FIXATING MESH POLYESTER WITH POLYLACTIC ACID GRIPS AND COLLAGEN FILM
Type: IMPLANTABLE DEVICE | Site: GROIN | Status: FUNCTIONAL
Brand: PROGRIP

## 2024-10-14 RX ORDER — HYDROMORPHONE HYDROCHLORIDE 1 MG/ML
0.6 INJECTION, SOLUTION INTRAMUSCULAR; INTRAVENOUS; SUBCUTANEOUS EVERY 5 MIN PRN
Status: DISCONTINUED | OUTPATIENT
Start: 2024-10-14 | End: 2024-10-14

## 2024-10-14 RX ORDER — LIDOCAINE HYDROCHLORIDE 10 MG/ML
INJECTION, SOLUTION EPIDURAL; INFILTRATION; INTRACAUDAL; PERINEURAL AS NEEDED
Status: DISCONTINUED | OUTPATIENT
Start: 2024-10-14 | End: 2024-10-14 | Stop reason: SURG

## 2024-10-14 RX ORDER — HEPARIN SODIUM 5000 [USP'U]/ML
5000 INJECTION, SOLUTION INTRAVENOUS; SUBCUTANEOUS ONCE
Status: COMPLETED | OUTPATIENT
Start: 2024-10-14 | End: 2024-10-14

## 2024-10-14 RX ORDER — HYDROCODONE BITARTRATE AND ACETAMINOPHEN 5; 325 MG/1; MG/1
1 TABLET ORAL ONCE AS NEEDED
Status: COMPLETED | OUTPATIENT
Start: 2024-10-14 | End: 2024-10-14

## 2024-10-14 RX ORDER — NALOXONE HYDROCHLORIDE 0.4 MG/ML
0.08 INJECTION, SOLUTION INTRAMUSCULAR; INTRAVENOUS; SUBCUTANEOUS AS NEEDED
Status: DISCONTINUED | OUTPATIENT
Start: 2024-10-14 | End: 2024-10-14

## 2024-10-14 RX ORDER — SCOLOPAMINE TRANSDERMAL SYSTEM 1 MG/1
1 PATCH, EXTENDED RELEASE TRANSDERMAL ONCE
Status: DISCONTINUED | OUTPATIENT
Start: 2024-10-14 | End: 2024-10-14 | Stop reason: HOSPADM

## 2024-10-14 RX ORDER — BUPIVACAINE HYDROCHLORIDE 2.5 MG/ML
INJECTION, SOLUTION EPIDURAL; INFILTRATION; INTRACAUDAL AS NEEDED
Status: DISCONTINUED | OUTPATIENT
Start: 2024-10-14 | End: 2024-10-14 | Stop reason: HOSPADM

## 2024-10-14 RX ORDER — SENNA AND DOCUSATE SODIUM 50; 8.6 MG/1; MG/1
1 TABLET, FILM COATED ORAL DAILY
Qty: 30 TABLET | Refills: 0 | Status: SHIPPED | OUTPATIENT
Start: 2024-10-14

## 2024-10-14 RX ORDER — MEPERIDINE HYDROCHLORIDE 25 MG/ML
12.5 INJECTION INTRAMUSCULAR; INTRAVENOUS; SUBCUTANEOUS AS NEEDED
Status: DISCONTINUED | OUTPATIENT
Start: 2024-10-14 | End: 2024-10-14

## 2024-10-14 RX ORDER — HYDROCODONE BITARTRATE AND ACETAMINOPHEN 5; 325 MG/1; MG/1
2 TABLET ORAL ONCE AS NEEDED
Status: COMPLETED | OUTPATIENT
Start: 2024-10-14 | End: 2024-10-14

## 2024-10-14 RX ORDER — GLYCOPYRROLATE 0.2 MG/ML
INJECTION, SOLUTION INTRAMUSCULAR; INTRAVENOUS AS NEEDED
Status: DISCONTINUED | OUTPATIENT
Start: 2024-10-14 | End: 2024-10-14 | Stop reason: SURG

## 2024-10-14 RX ORDER — MIDAZOLAM HYDROCHLORIDE 1 MG/ML
1 INJECTION INTRAMUSCULAR; INTRAVENOUS EVERY 5 MIN PRN
Status: DISCONTINUED | OUTPATIENT
Start: 2024-10-14 | End: 2024-10-14

## 2024-10-14 RX ORDER — NEOSTIGMINE METHYLSULFATE 1 MG/ML
INJECTION INTRAVENOUS AS NEEDED
Status: DISCONTINUED | OUTPATIENT
Start: 2024-10-14 | End: 2024-10-14 | Stop reason: SURG

## 2024-10-14 RX ORDER — HYDROMORPHONE HYDROCHLORIDE 1 MG/ML
0.4 INJECTION, SOLUTION INTRAMUSCULAR; INTRAVENOUS; SUBCUTANEOUS EVERY 5 MIN PRN
Status: DISCONTINUED | OUTPATIENT
Start: 2024-10-14 | End: 2024-10-14

## 2024-10-14 RX ORDER — OXYCODONE HYDROCHLORIDE 5 MG/1
5 TABLET ORAL EVERY 6 HOURS PRN
Qty: 20 TABLET | Refills: 0 | Status: SHIPPED | OUTPATIENT
Start: 2024-10-14

## 2024-10-14 RX ORDER — SODIUM CHLORIDE, SODIUM LACTATE, POTASSIUM CHLORIDE, CALCIUM CHLORIDE 600; 310; 30; 20 MG/100ML; MG/100ML; MG/100ML; MG/100ML
INJECTION, SOLUTION INTRAVENOUS CONTINUOUS
Status: DISCONTINUED | OUTPATIENT
Start: 2024-10-14 | End: 2024-10-14

## 2024-10-14 RX ORDER — ACETAMINOPHEN 500 MG
1000 TABLET ORAL ONCE AS NEEDED
Status: COMPLETED | OUTPATIENT
Start: 2024-10-14 | End: 2024-10-14

## 2024-10-14 RX ORDER — LABETALOL HYDROCHLORIDE 5 MG/ML
5 INJECTION, SOLUTION INTRAVENOUS EVERY 5 MIN PRN
Status: DISCONTINUED | OUTPATIENT
Start: 2024-10-14 | End: 2024-10-14

## 2024-10-14 RX ORDER — CEFAZOLIN SODIUM 1 G/3ML
INJECTION, POWDER, FOR SOLUTION INTRAMUSCULAR; INTRAVENOUS AS NEEDED
Status: DISCONTINUED | OUTPATIENT
Start: 2024-10-14 | End: 2024-10-14 | Stop reason: SURG

## 2024-10-14 RX ORDER — IBUPROFEN 600 MG/1
600 TABLET, FILM COATED ORAL ONCE AS NEEDED
Status: DISCONTINUED | OUTPATIENT
Start: 2024-10-14 | End: 2024-10-14

## 2024-10-14 RX ORDER — ONDANSETRON 2 MG/ML
INJECTION INTRAMUSCULAR; INTRAVENOUS AS NEEDED
Status: DISCONTINUED | OUTPATIENT
Start: 2024-10-14 | End: 2024-10-14 | Stop reason: SURG

## 2024-10-14 RX ORDER — ACETAMINOPHEN 500 MG
1000 TABLET ORAL ONCE
Status: DISCONTINUED | OUTPATIENT
Start: 2024-10-14 | End: 2024-10-14 | Stop reason: HOSPADM

## 2024-10-14 RX ORDER — ONDANSETRON 2 MG/ML
4 INJECTION INTRAMUSCULAR; INTRAVENOUS EVERY 6 HOURS PRN
Status: DISCONTINUED | OUTPATIENT
Start: 2024-10-14 | End: 2024-10-14

## 2024-10-14 RX ORDER — HYDROMORPHONE HYDROCHLORIDE 1 MG/ML
0.2 INJECTION, SOLUTION INTRAMUSCULAR; INTRAVENOUS; SUBCUTANEOUS EVERY 5 MIN PRN
Status: DISCONTINUED | OUTPATIENT
Start: 2024-10-14 | End: 2024-10-14

## 2024-10-14 RX ORDER — PROCHLORPERAZINE EDISYLATE 5 MG/ML
5 INJECTION INTRAMUSCULAR; INTRAVENOUS EVERY 8 HOURS PRN
Status: DISCONTINUED | OUTPATIENT
Start: 2024-10-14 | End: 2024-10-14

## 2024-10-14 RX ORDER — ROCURONIUM BROMIDE 10 MG/ML
INJECTION, SOLUTION INTRAVENOUS AS NEEDED
Status: DISCONTINUED | OUTPATIENT
Start: 2024-10-14 | End: 2024-10-14 | Stop reason: SURG

## 2024-10-14 RX ORDER — DEXAMETHASONE SODIUM PHOSPHATE 4 MG/ML
VIAL (ML) INJECTION AS NEEDED
Status: DISCONTINUED | OUTPATIENT
Start: 2024-10-14 | End: 2024-10-14 | Stop reason: SURG

## 2024-10-14 RX ORDER — MIDAZOLAM HYDROCHLORIDE 1 MG/ML
INJECTION INTRAMUSCULAR; INTRAVENOUS AS NEEDED
Status: DISCONTINUED | OUTPATIENT
Start: 2024-10-14 | End: 2024-10-14 | Stop reason: SURG

## 2024-10-14 RX ORDER — EPHEDRINE SULFATE 50 MG/ML
INJECTION INTRAVENOUS AS NEEDED
Status: DISCONTINUED | OUTPATIENT
Start: 2024-10-14 | End: 2024-10-14 | Stop reason: SURG

## 2024-10-14 RX ADMIN — DEXAMETHASONE SODIUM PHOSPHATE 8 MG: 4 MG/ML VIAL (ML) INJECTION at 14:31:00

## 2024-10-14 RX ADMIN — SODIUM CHLORIDE, SODIUM LACTATE, POTASSIUM CHLORIDE, CALCIUM CHLORIDE: 600; 310; 30; 20 INJECTION, SOLUTION INTRAVENOUS at 14:19:00

## 2024-10-14 RX ADMIN — MIDAZOLAM HYDROCHLORIDE 2 MG: 1 INJECTION INTRAMUSCULAR; INTRAVENOUS at 14:21:00

## 2024-10-14 RX ADMIN — EPHEDRINE SULFATE 10 MG: 50 INJECTION INTRAVENOUS at 14:41:00

## 2024-10-14 RX ADMIN — CEFAZOLIN SODIUM 2 G: 1 INJECTION, POWDER, FOR SOLUTION INTRAMUSCULAR; INTRAVENOUS at 14:46:00

## 2024-10-14 RX ADMIN — EPHEDRINE SULFATE 5 MG: 50 INJECTION INTRAVENOUS at 15:31:00

## 2024-10-14 RX ADMIN — LIDOCAINE HYDROCHLORIDE 100 MG: 10 INJECTION, SOLUTION EPIDURAL; INFILTRATION; INTRACAUDAL; PERINEURAL at 14:25:00

## 2024-10-14 RX ADMIN — ONDANSETRON 4 MG: 2 INJECTION INTRAMUSCULAR; INTRAVENOUS at 15:28:00

## 2024-10-14 RX ADMIN — ROCURONIUM BROMIDE 50 MG: 10 INJECTION, SOLUTION INTRAVENOUS at 14:26:00

## 2024-10-14 RX ADMIN — SODIUM CHLORIDE, SODIUM LACTATE, POTASSIUM CHLORIDE, CALCIUM CHLORIDE: 600; 310; 30; 20 INJECTION, SOLUTION INTRAVENOUS at 14:30:00

## 2024-10-14 RX ADMIN — NEOSTIGMINE METHYLSULFATE 5 MG: 1 INJECTION INTRAVENOUS at 15:31:00

## 2024-10-14 RX ADMIN — GLYCOPYRROLATE 0.8 MG: 0.2 INJECTION, SOLUTION INTRAMUSCULAR; INTRAVENOUS at 15:31:00

## 2024-10-14 RX ADMIN — SODIUM CHLORIDE, SODIUM LACTATE, POTASSIUM CHLORIDE, CALCIUM CHLORIDE: 600; 310; 30; 20 INJECTION, SOLUTION INTRAVENOUS at 15:45:00

## 2024-10-14 NOTE — ANESTHESIA PREPROCEDURE EVALUATION
PRE-OP EVALUATION    Patient Name: Trey Aguilar    Admit Diagnosis: Right inguinal hernia [K40.90]    Pre-op Diagnosis: Right inguinal hernia [K40.90]    ROBOTIC LAPAROSCOPIC RIGHT INGUINAL HERNIA REPAIR WITH MESH    Anesthesia Procedure: ROBOTIC LAPAROSCOPIC RIGHT INGUINAL HERNIA REPAIR WITH MESH (Right: Abdomen)    Surgeons and Role:     * Shon López MD - Primary    Pre-op vitals reviewed.  Temp: 97.4 °F (36.3 °C)  Pulse: 61  Resp: 18  BP: 134/87     Body mass index is 26.11 kg/m².    Current medications reviewed.  Hospital Medications:   acetaminophen (Tylenol Extra Strength) tab 1,000 mg  1,000 mg Oral Once    scopolamine (Transderm-Scop) 1 MG/3DAYS patch 1 patch  1 patch Transdermal Once    lactated ringers infusion   Intravenous Continuous    [COMPLETED] heparin (Porcine) 5000 UNIT/ML injection 5,000 Units  5,000 Units Subcutaneous Once    ceFAZolin (Ancef) 2g in 10mL IV syringe premix  2 g Intravenous Once       Outpatient Medications:   Prescriptions Prior to Admission[1]    Allergies: Penicillins      Anesthesia Evaluation    Patient summary reviewed.    Anesthetic Complications  (-) history of anesthetic complications         GI/Hepatic/Renal    Negative GI/hepatic/renal ROS.                             Cardiovascular    Negative cardiovascular ROS.    Exercise tolerance: good     MET: >4                                           Endo/Other    Negative endo/other ROS.                              Pulmonary    Negative pulmonary ROS.                       Neuro/Psych    Negative neuro/psych ROS.                          As per Epic:  Patient Active Problem List:     Disorders of bursae and tendons in shoulder region, unspecified     Cervicalgia     History of adenomatous polyp of colon     Diverticulosis     Internal hemorrhoids     Right inguinal hernia          Past Surgical History:   Procedure Laterality Date    Arthroscopy of joint unlisted Right     Colonoscopy      Electrocardiogram,  complete  08/28/2003    Other surgical history  11/28/2012    endoscopy    Other surgical history Left     shoulder replacement at [facility]     Social History     Socioeconomic History    Marital status:    Tobacco Use    Smoking status: Former     Types: Cigars    Smokeless tobacco: Never   Vaping Use    Vaping status: Never Used   Substance and Sexual Activity    Alcohol use: Yes     Comment: SOC    Drug use: No     History   Drug Use No     Available pre-op labs reviewed.               Airway      Mallampati: II  Mouth opening: >3 FB  TM distance: > 6 cm  Neck ROM: full Cardiovascular      Rhythm: regular  Rate: normal  (-) murmur   Dental  Comment: Dentition is grossly intact;  Patient does not demonstrate loose teeth to inspection.           Pulmonary  Comment: Unlabored ventilatory effort, no retractions.  Pulmonary exam normal.  Breath sounds clear to auscultation bilaterally.               Other findings              ASA: 2   Plan: general  NPO status verified and patient meets guidelines.        Comment: I explained intrinsic risks of general anesthesia, including nausea, dental damage, sore throat, mouth injury,and hoarseness from airway management.  All questions were answered and understanding was demonstrated of risks.  Informed permission was obtained to proceed as documented in the signed consent form.     Plan/risks discussed with: patient and spouse                Present on Admission:  **None**             [1]   Medications Prior to Admission   Medication Sig Dispense Refill Last Dose/Taking    PRAVASTATIN 20 MG Oral Tab Take 1 tablet (20 mg total) by mouth nightly. 90 tablet 0 10/13/2024 at 11:00 PM    VALACYCLOVIR 1 G Oral Tab Take 1 tablet (1,000 mg total) by mouth 2 (two) times daily. (Patient taking differently: Take 1 tablet (1,000 mg total) by mouth 2 (two) times daily as needed.) 20 tablet 0 Unknown    TADALAFIL 20 MG Oral Tab TAKE ONE TABLET BY MOUTH DAILY AS NEEDED FOR  ERECTILE DYSFUNCTION 12 tablet 0 Unknown

## 2024-10-14 NOTE — ANESTHESIA PROCEDURE NOTES
Airway  Date/Time: 10/14/2024 2:27 PM  Urgency: elective    Airway not difficult    General Information and Staff    Patient location during procedure: OR  Anesthesiologist: Stalin Armenta MD  Resident/CRNA: Amanda Mansfield CRNA  Performed: CRNA   Performed by: Amanda Mansfield CRNA  Authorized by: Stalin Armenta MD      Indications and Patient Condition  Indications for airway management: anesthesia  Sedation level: deep  Preoxygenated: yes  Patient position: sniffing  Mask difficulty assessment: 1 - vent by mask    Final Airway Details  Final airway type: endotracheal airway      Successful airway: ETT  Cuffed: yes   Successful intubation technique: direct laryngoscopy  Endotracheal tube insertion site: oral  Blade: Tessie  Blade size: #3  ETT size (mm): 7.5    Cormack-Lehane Classification: grade I - full view of glottis  Placement verified by: capnometry   Measured from: lips  ETT to lips (cm): 23  Number of attempts at approach: 1    Additional Comments  Atraumatic intubation. Dentition and OP as per preop. Intubation by Dr. Armenta.

## 2024-10-14 NOTE — BRIEF OP NOTE
Pre-Operative Diagnosis: Right inguinal hernia [K40.90]     Post-Operative Diagnosis: Right inguinal hernia [K40.90]      Procedure Performed:   ROBOTIC LAPAROSCOPIC RIGHT INGUINAL HERNIA REPAIR WITH MESH    Surgeons and Role:     * Shon López MD - Primary    Assistant(s):  PA: Danae Morel PA-C     Surgical Findings: right indirect inguinal hernia with lipoma  in the canal     Specimen: none     Estimated Blood Loss: Blood Output: 2 mL (10/14/2024  3:27 PM)      Dictation Number:      Shon López MD  10/14/2024  3:33 PM

## 2024-10-14 NOTE — DISCHARGE INSTRUCTIONS
Home Care Instructions  Robotic Inguinal Hernia  Dr Shon López    MEDICATIONS  For post-operative pain control the medications are usually Vicodin or Norco. These are narcotics and are best taken by starting with one tablet and repeating every four to six hours as needed. If the patient does not feel they need the narcotics they shouldn’t take them. If the pain is severe the patient may take up to two pills every four hours. If a minor supplement is necessary in addition to the narcotics do not overlap with Tylenol (any product with acetaminophen) as both Vicodin and Norco contain Tylenol. Please supplement with Advil (ibuprofen) or Motrin. Please ask your surgeon before resuming blood thinners such as aspirin, plavix or coumadin. All other home medications may be resumed as scheduled.    DIET  The patient may resume a general diet immediately. This is not a good time to eat excessively. The patient should eat in moderation and stick with foods the patient feels are easy to digest. There should be no alcohol consumption in the immediate recover time period or within six hours of taking narcotics.    WOUND CARE  The top dressing may be removed the day after surgery. This includes the gauze, tape and band-aids if they are present. Do not remove the steri-strips or butterfly tapes that are white and adherent to the skin. The steri-strips will eventually peel up at the ends and at this point they may be removed. This is usually seven to ten days after surgery. The patient may shower the day after surgery. There is no need to cover the incisions and all top gauze type dressing should be removed prior to showering. Soap can get on the wounds but do not scrub over the wounds. No hair dye or chemicals of any kind should get in the wounds. Avoid tub baths for two weeks. If visible sutures or staples are present they will be removed in the office by the surgeon or nurse. Most wounds will be closed with dissolving suture  underneath the skin. These sutures will dissolve on their own.    ACTIVITY  Every day the patient should be up, showered and dressed. Each day the patient should be up and around the house. The patient should not lie in bed and should not stay in pajamas. We count on the patient being up, coughing, walking and deep breathing to avoid pneumonia and blood clots in the legs. Once a day the patient should get out of the house and go shopping, go to the mall, the [employer] store, the movies or a restaurant. The patient may ride in a car but should not drive the car for at least one week. Patients should be off narcotics for at least 8 hours prior to being the . The average time off work is 10 to 14 days; most adults will be seeing the surgeon prior to returning to work. Patients may go up and down stairs and lift up to five pounds but no bending, pushing or pulling. Nothing called work or exercise until the follow up visit. No ‘stair-master’ poser walking, jogging or workout until the follow up visit. Patients should seek further activity limits at the time of their appointment. If the patient is unable to urinate and feels a painful and full bladder call us immediately.    APPOINTMENT  Please call our office today for an appointment within five to ten days of discharge Any fever greater than 100.5, chills, nausea, vomiting, or severe diarrhea please call our office. If the wound turns red, hot, swollen, becomes increasingly painful, or drains pus call us immediately at [phone]. For life threatening emergencies call [phone]. For non-emergent care please call our office after 9:30 a.m. Monday through Saturday. The number listed above is our office number, our phone automatically switches to out answering service if we are not there. Please do not use the emergency room for nonurgent care.      Thank you for entrusting me with your care.      You have been given a prescription for Norco 5/325  Norco was Given to  you at:5:00 pm  Next dose due:  11:00 pm  Take this medication as directed  This medication contains Tylenol (acetaminophen)  Do not take additional Tylenol while taking Norco    Norco is a Narcotic and can be constipating or upset your stomach  Don't take Norco on an empty stomach  Drink plenty of water  Alcoholic beverages should be avoided while taking narcotics

## 2024-10-14 NOTE — OPERATIVE REPORT
OPERATIVE REPORT    PREOPERATIVE DIAGNOSIS:  Right inguinal hernia.  POSTOPERATIVE DIAGNOSIS:  Right incarcerated inguinal hernia.  PROCEDURE PERFORMED: Robotic right incarcerated inguinal hernia repair with mesh ( ProGrip mesh used)      ASSISTANT: Ms Maria Teresa PA-C    ANESTHESIA: General endotracheal anesthesia.     Anesthesiologist.: Stalin Armenta MD  CRNA.: Amanda Mansfield CRNA    BLOOD LOSS: Less than 5 mL.    COMPLICATIONS: None apparent.    IMPLANTS: Mesh as above.    FINDINGS: Right incarcerated inguinal hernia.    COMPLICATIONS: None apparent.    DISPOSITION: The patient was awakened from anesthesia and brought to the recovery room in stable condition. He tolerated the procedure without apparent complication. Needle, sponge, instrument counts were correct at the end of procedure. Please note preprocedure antibiotic and DVT prophylaxis administered per protocol and a time-out were performed prior to initiating the procedure identifying the correct patient, procedure, surgeon, and sites of surgery. I indicated the sites of surgery in the preoperative holding area and the patient concurred.    INDICATIONS: Please see the preprocedure history and physical. Briefly, the patient is a  64 year old male with a painful bulge of the inguinal region. Physical examination is consistent with inguinal hernia. The risks, benefits, and alternatives of robotic inguinal hernia repair were explained to the patient.  The risks explained included but were not limited to bleeding, infection, recurrence, injury to adjacent organs and structures, injury to the vas deferens, injury to the blood supply of the testicle with potential for testicular ischemia or atrophy. The need for therapeutic, diagnostic for surgical intervention was also discussed with the patient. The patient voiced understanding. All pertinent questions were answered to the patient's satisfaction after which the patient provided willing and informed consent to  proceed with surgery.    PROCEDURE: The patient was brought to the operating room, and after induction of general endotracheal anesthesia, the anesthesiologist performed an TAP block.  Next, the abdomen was prepped and draped in usual sterile fashion.  The patient was placed in Trendelenburg position. The umbilicus was grasped, elevated with an Allis clamp and 2 perforating towel clips, and an 8-mm incision was created cephalad to the umbilicus. Through this a Veress needle was introduced into the abdomen and pneumoperitoneum was established in usual manner. An 8-mm DaVinci Visi-Port trocar was then placed under direct vision followed by a laparoscope. Diagnostic survey of the abdomen revealed no other acute pathology or iatrogenic injury. Attention was turned to the inguinal region where the hernia defect was identified. At this point, two 8 mm trocars were placed on either side of the abdomen in the midclavicular line under direct vision. Next, the robot was then docked and instruments placed under direct vision. Repair of the hernia was initiated. The peritoneum was grasped, retracted, and divided . Once the peritoneum was incised, blunt and sharp dissection was used with judicious use of electrocautery to achieve hemostasis. Medial to lateral dissection was accomplished identifying initially the pubic tubercle and Ronaldo's ligament and the soft tissues were dissected laterally to create a sufficient pocket to accommodate mesh. Next, the hernia sac was grasped and retracted. Blunt and sharp dissection technique was utilized to reduce the hernia sac into the abdominal cavity. Once this was accomplished, a ProGrip  mesh was placed in the inguinal region. Once the mesh was in place, the peritoneal flap was then reapproximated in anatomic position and secured using running 2-0 V-Loc suture. At the completion of the operation, all instruments were removed. Needle, sponge, instrument counts were correct.  Pneumoperitoneum was released and all instruments had been removed under direct vision as well.  The skin incisions are closed with 4-0 Monocryl suture. Dermabond was used to seal the incisions. The anesthesiologist performed a TAP block. The patient was awakened from anesthesia, brought to the recovery room in stable condition having tolerated procedure without apparent complication. Needle, sponge, instrument counts correct at the end of procedure and operative findings were discussed with the patient's family.        Shon López MD FACS  Trauma Medical Director, [facility] General Surgery    The 21st Century Cures Act makes medical notes like these available to patients in the interest of transparency. Please be advised this is a medical document. Medical documents are intended to carry relevant information, facts as evident, and the clinical opinion of the practitioner. The medical note is intended as peer to peer communication and may appear blunt or direct. It is written in medical language and may contain abbreviations or verbiage that are unfamiliar.    This note was prepared using Dragon Medical voice recognition dictation software. As a result, errors may occur. When identified, these errors have been corrected. While every attempt is made to correct errors during dictation, discrepancies may still exist.

## 2024-10-14 NOTE — H&P
New Patient Visit Note           History of Present Illness     The patient presents to request of his primary care physician for evaluation of right inguinal pain and a right inguinal hernia.  Recently the patient noted discomfort in the right inguinal region and ultrasound ordered by his primary care physician confirms a sliding right inguinal hernia more prominent with Valsalva.  I reviewed the images and I concur with the interpretation.  I discussed findings with the patient in context of his presenting symptoms.  The patient has no other bulges or discomfort and there is no discomfort remote from the inguinal region.  No other complaints offered.    The patient denies fever, chills, chest pain, shortness of breath, dyspnea. The patient also denies hematemesis, melena, or hematochezia. The patient denies change in bowel or bladder habits. There is no complaint of hematuria or dysuria.    I discussed the risk, benefits, alternatives of surgery.  I discussed the anticipated postoperative recovery including dietary, activity, exercise, and lifestyle recommendations.  The patient's questions regarding surgical procedure were answered and the patient voiced understanding of the care plan.    Allergies  Trey is allergic to penicillins.    Past Medical / Surgical / Social / Family History    The past medical and past surgical history have been reviewed by me today.    Past Medical History:    Abdominal pain    Acute pain due to trauma    Arthritis    Arthritis of left shoulder region    Cervicalgia    Chest discomfort    Creatinine elevation    Decorative tattoo    Finger injury    Hemorrhoids    High cholesterol    Hyperlipidemia    Maxillary sinus cyst    2 cm mucous retention cyst Rt maxillary sinus    Neck pain    w/ disc degeneration & narrowing of C3-C7 discs & small focal disc herniation at C4    Shingles    Shoulder dislocation    Rt    Unspecified disorders of bursae and tendons in shoulder region     Past  Surgical History:   Procedure Laterality Date    Arthroscopy of joint unlisted Right     Colonoscopy      Electrocardiogram, complete  08/28/2003    Other surgical history  11/28/2012    endoscopy    Other surgical history Left     shoulder replacement at [facility]       The family history and social history have been reviewed by me today.    Family History   Problem Relation Age of Onset    Lipids Father         hyperlipidemia    Arthritis Father         OA    Hypertension Father     Heart Disease Father         CAD    Heart Surgery Father         CABG    Lipids Mother         hyperlipidemia    Other (Alzheimers) Sister     Other (autistic) Son      Social History     Socioeconomic History    Marital status:    Tobacco Use    Smoking status: Former     Types: Cigars    Smokeless tobacco: Never   Vaping Use    Vaping status: Never Used   Substance and Sexual Activity    Alcohol use: Yes     Comment: SOC    Drug use: No      No current outpatient medications on file.      Review of Systems  The Review of Systems has been reviewed by me during today.  Review of Systems   Constitutional: Negative.    HENT: Negative.     Eyes: Negative.    Respiratory: Negative.     Cardiovascular: Negative.    Gastrointestinal: Negative.    Genitourinary: Negative.    Musculoskeletal: Negative.    Skin: Negative.    Neurological: Negative.    Psychiatric/Behavioral: Negative.         Physical Findings   /87 (BP Location: Left arm)   Pulse 61   Temp 97.4 °F (36.3 °C) (Temporal)   Resp 18   Ht 70\"   Wt 182 lb (82.6 kg)   BMI 26.11 kg/m²   Physical Exam  Vitals and nursing note reviewed.   Constitutional:       General: He is not in acute distress.     Appearance: He is well-developed.   HENT:      Head: Normocephalic and atraumatic.   Eyes:      General: No scleral icterus.     Pupils: Pupils are equal, round, and reactive to light.   Neck:      Vascular: No JVD.      Trachea: No tracheal deviation.    Cardiovascular:      Rate and Rhythm: Normal rate and regular rhythm.   Pulmonary:      Effort: Pulmonary effort is normal. No respiratory distress.      Breath sounds: No stridor.   Abdominal:      General: Bowel sounds are normal. There is no distension.      Palpations: Abdomen is soft. Abdomen is not rigid. There is no mass.      Tenderness: There is no abdominal tenderness. There is no guarding or rebound. Negative signs include Faye's sign and McBurney's sign.      Hernia: A hernia is present. Hernia is present in the right inguinal area. There is no hernia in the left inguinal area.   Genitourinary:     Penis: Normal. No phimosis, paraphimosis, hypospadias, erythema, tenderness, discharge, swelling or lesions.       Testes: Normal.         Right: Mass, tenderness, swelling, testicular hydrocele or varicocele not present. Right testis is descended. Cremasteric reflex is present.          Left: Mass, tenderness, swelling, testicular hydrocele or varicocele not present. Left testis is descended. Cremasteric reflex is present.       Epididymis:      Right: Normal.      Left: Normal.       Musculoskeletal:         General: Normal range of motion.      Cervical back: Normal range of motion and neck supple.   Skin:     General: Skin is warm and dry.   Neurological:      Mental Status: He is alert and oriented to person, place, and time.   Psychiatric:         Behavior: Behavior normal.             Assessment     Right inguinal hernia        Plan     The patient will be scheduled for robotic repair of right inguinal hernia with mesh.    The deepak-operative care plan was discussed with the patient, who voices understanding.  Activity and lifting recommendations were discussed in length.     The risks, benefits, and alternatives to the procedure were explained to the patient.  The risks explained include, but are not limited to, bleeding, infection, pain wound complications, recurrence, incorrect diagnosis, injury  to adjacent organs and structures. We also discussed the possibile need for further therapeutic, diagnostic, or surgical intervention.  The patient voiced understanding, and after all questions were answered to the patient's satisfaction, the patient provided willing and informed consent to proceed.     Shon López MD

## 2024-10-14 NOTE — ANESTHESIA POSTPROCEDURE EVALUATION
[facility] Patient Status:  Hospital Outpatient Surgery   Age/Gender 64 year old male MRN [id number]   Location [facility] POST ANESTHESIA CARE UNIT Attending Shon López MD   Hosp Day # 0 PCP Yogesh Ulrich MD       Anesthesia Post-op Note    ROBOTIC LAPAROSCOPIC RIGHT INGUINAL HERNIA REPAIR WITH MESH    Procedure Summary       Date: 10/14/24 Room / Location:  MAIN OR  /  MAIN OR    Anesthesia Start: 1419 Anesthesia Stop: 1554    Procedure: ROBOTIC LAPAROSCOPIC RIGHT INGUINAL HERNIA REPAIR WITH MESH (Right: Abdomen) Diagnosis:       Right inguinal hernia      (Right inguinal hernia [K40.90])    Surgeons: Shon López MD Anesthesiologist: Stalin Armenta MD    Anesthesia Type: general ASA Status: 2            Anesthesia Type: general    Vitals Value Taken Time   /83 10/14/24 1550   Temp 98.5 10/14/24 1554   Pulse 66 10/14/24 1553   Resp 16 10/14/24 1553   SpO2 97 % 10/14/24 1553   Vitals shown include unfiled device data.    Patient Location: PACU    Anesthesia Type: general    Airway Patency: patent and extubated    Postop Pain Control: adequate    Mental Status: mildly sedated but able to meaningfully participate in the post-anesthesia evaluation    Nausea/Vomiting: none    Cardiopulmonary/Hydration status: stable euvolemic    Complications: no apparent anesthesia related complications    Postop vital signs: stable    Comments: Report to PACU [facility] [place].    Dental Exam: Unchanged from Preop    Patient to be discharged from PACU when criteria met.

## 2024-10-28 ENCOUNTER — OFFICE VISIT (OUTPATIENT)
Facility: LOCATION | Age: 64
End: 2024-10-28
Payer: COMMERCIAL

## 2024-10-28 VITALS
OXYGEN SATURATION: 96 % | SYSTOLIC BLOOD PRESSURE: 154 MMHG | BODY MASS INDEX: 26.48 KG/M2 | DIASTOLIC BLOOD PRESSURE: 84 MMHG | HEIGHT: 70 IN | TEMPERATURE: 99 F | WEIGHT: 185 LBS | HEART RATE: 72 BPM

## 2024-10-28 DIAGNOSIS — Z87.19 STATUS POST RIGHT INGUINAL HERNIA REPAIR: ICD-10-CM

## 2024-10-28 DIAGNOSIS — Z98.890 POSTOPERATIVE STATE: Primary | ICD-10-CM

## 2024-10-28 DIAGNOSIS — Z98.890 STATUS POST RIGHT INGUINAL HERNIA REPAIR: ICD-10-CM

## 2024-10-28 PROCEDURE — 3008F BODY MASS INDEX DOCD: CPT

## 2024-10-28 PROCEDURE — 3077F SYST BP >= 140 MM HG: CPT

## 2024-10-28 PROCEDURE — 99024 POSTOP FOLLOW-UP VISIT: CPT

## 2024-10-28 PROCEDURE — 3079F DIAST BP 80-89 MM HG: CPT

## 2024-10-28 RX ORDER — VALACYCLOVIR HYDROCHLORIDE 1 G/1
1000 TABLET, FILM COATED ORAL 2 TIMES DAILY
Qty: 20 TABLET | Refills: 0 | Status: SHIPPED | OUTPATIENT
Start: 2024-10-28

## 2024-10-28 NOTE — TELEPHONE ENCOUNTER
.A refill request was received for:  Requested Prescriptions     Pending Prescriptions Disp Refills    VALACYCLOVIR 1 G Oral Tab [Pharmacy Med Name: Valacyclovir Hydrochloride 1 Gm Tab Nort] 20 tablet 0     Sig: Take 1 tablet (1,000 mg total) by mouth 2 (two) times daily.       Last refill date:   8/15/24    Last office visit: 3/15/24    Follow up due:  Future Appointments   Date Time Provider Department Center   10/28/2024  9:15 AM Linh Barros PA-C [id number]   11/1/2024  8:30 AM Yogesh Ulrich MD [facility] [address]

## 2024-10-28 NOTE — PROGRESS NOTES
Post Operative Visit Note       Active Problems  1. Postoperative state    2. Status post right inguinal hernia repair         Chief Complaint   Chief Complaint   Patient presents with    Post-Op     PO-10/14 ROBOTIC LAPAROSCOPIC RIGHT INGUINAL HERNIA REPAIR WITH MESH          History of Present Illness   The patient presents for continued care and evaluation following a robotic right inguinal hernia repair with mesh with Dr. López on 10/14/2024.     Overall, the patient is doing well postoperatively. The patient denies any post operative pain.  He states he does still feel something in the right groin similar to when he had a hernia.  He states he never had a bulge in the area and does not have one now.  He denies feeling a firmness in the area.    He is tolerating diet.  He denies nausea or vomiting.    He denies diarrhea or constipation.    He has been wearing an abdominal binder at all times including to sleep.  He is curious whether he needs to continue to do this.      Allergies  Trey is allergic to penicillins.    Past Medical / Surgical / Social / Family History    The past medical and past surgical history have been reviewed by me today.     Past Medical History:    Abdominal pain    Acute pain due to trauma    Arthritis    Arthritis of left shoulder region    Cervicalgia    Chest discomfort    Creatinine elevation    Decorative tattoo    Finger injury    Hemorrhoids    High cholesterol    Hyperlipidemia    Maxillary sinus cyst    2 cm mucous retention cyst Rt maxillary sinus    Neck pain    w/ disc degeneration & narrowing of C3-C7 discs & small focal disc herniation at C4    Shingles    Shoulder dislocation    Rt    Unspecified disorders of bursae and tendons in shoulder region     Past Surgical History:   Procedure Laterality Date    Arthroscopy of joint unlisted Right     Colonoscopy      Electrocardiogram, complete  08/28/2003    Other surgical history  11/28/2012    endoscopy    Other surgical  history Left     shoulder replacement at [facility]       The family history and social history have been reviewed by me today.    Family History   Problem Relation Age of Onset    Lipids Father         hyperlipidemia    Arthritis Father         OA    Hypertension Father     Heart Disease Father         CAD    Heart Surgery Father         CABG    Lipids Mother         hyperlipidemia    Other (Alzheimers) Sister     Other (autistic) Son      Social History     Socioeconomic History    Marital status:    Tobacco Use    Smoking status: Former     Types: Cigars    Smokeless tobacco: Never   Vaping Use    Vaping status: Never Used   Substance and Sexual Activity    Alcohol use: Yes     Comment: SOC    Drug use: No        Current Outpatient Medications:     VALACYCLOVIR 1 G Oral Tab, Take 1 tablet (1,000 mg total) by mouth 2 (two) times daily., Disp: 20 tablet, Rfl: 0    oxyCODONE 5 MG Oral Tab, Take 1 tablet (5 mg total) by mouth every 6 (six) hours as needed for Pain., Disp: 20 tablet, Rfl: 0    senna-docusate 8.6-50 MG Oral Tab, Take 1 tablet by mouth daily., Disp: 30 tablet, Rfl: 0    PRAVASTATIN 20 MG Oral Tab, Take 1 tablet (20 mg total) by mouth nightly., Disp: 90 tablet, Rfl: 0    TADALAFIL 20 MG Oral Tab, TAKE ONE TABLET BY MOUTH DAILY AS NEEDED FOR ERECTILE DYSFUNCTION, Disp: 12 tablet, Rfl: 0      Review of Systems  The Review of Systems has been reviewed by me during today.  Review of Systems    Physical Findings   There were no vitals taken for this visit.  Physical Exam  Vitals and nursing note reviewed.   Constitutional:       General: He is not in acute distress.     Appearance: Normal appearance.   HENT:      Head: Normocephalic and atraumatic.      Right Ear: External ear normal.      Left Ear: External ear normal.      Nose: Nose normal.   Eyes:      General: No scleral icterus.     Conjunctiva/sclera: Conjunctivae normal.   Abdominal:      General: Abdomen is flat. There is no  distension.      Palpations: Abdomen is soft. There is no mass.      Tenderness: There is no abdominal tenderness.      Hernia: No hernia is present.      Comments: Clinical exam of the abdomen reveals it to be soft, nondistended, nontender to palpation.  Laparoscopic incision sites are clean, dry, intact without surrounding erythema or cellulitis.  Skin glue remains in place.  No evidence of recurrence of his inguinal hernia with Valsalva.     Musculoskeletal:      Cervical back: Normal range of motion and neck supple.   Neurological:      Mental Status: He is alert.   Psychiatric:         Mood and Affect: Mood normal.         Behavior: Behavior normal.         Thought Content: Thought content normal.             Assessment   1. Postoperative state    2. Status post right inguinal hernia repair          Plan   The patient is doing well status post robotic right inguinal hernia repair.     I discussed with the patient that they should refrain from any bending, pushing, pulling, twisting, or lifting of a force greater than 15-20 pounds for 6 weeks post-op. Activity restrictions were reviewed. Driving restrictions were reviewed.     I explained to the patient that he can limit his usage of his abdominal binder to as needed.  He can continue to wear compression shorts as needed for swelling.  I explained that swelling will continue to improve over the next 1 to 2 weeks.    I also explained to the patient that there is no evidence of recurrence of hernia.  He could have a possible seroma or mild discomfort from the mesh in place.  I explained this should improve with time.    The patient may shower. They should avoid scrubbing their incisions, but may allow soap and water to wash over the incisions. The patient should avoid submerging their incisions in a bath, hot tub, pool for a total of 2 weeks postoperatively.    The patient should continue a general diet.    The patient may take ibuprofen and Tylenol as needed for  pain management.  They may also utilize heating pads or ice packs for comfort measures.    All of the patient's questions were answered.  The patient verbalized understanding and agreement with the plan of care.    I have no further follow-up scheduled with this patient at this time.  This patient can see a Physician Assistant or Dr. López on an as-needed basis.  I explained to the patient that if his symptoms persist, then he can call and schedule follow-up with Dr. López in approximately 1 month.  This patient should return urgently for any problems or complications related to the surgical intervention.         No orders of the defined types were placed in this encounter.      Imaging & Referrals   None    Follow Up  No follow-ups on file.    Linh Barros PA-C

## 2024-11-01 ENCOUNTER — LAB ENCOUNTER (OUTPATIENT)
Dept: LAB | Age: 64
End: 2024-11-01
Attending: FAMILY MEDICINE
Payer: COMMERCIAL

## 2024-11-01 ENCOUNTER — OFFICE VISIT (OUTPATIENT)
Dept: FAMILY MEDICINE CLINIC | Facility: CLINIC | Age: 64
End: 2024-11-01
Payer: COMMERCIAL

## 2024-11-01 ENCOUNTER — PATIENT MESSAGE (OUTPATIENT)
Dept: FAMILY MEDICINE CLINIC | Facility: CLINIC | Age: 64
End: 2024-11-01

## 2024-11-01 VITALS
HEART RATE: 72 BPM | OXYGEN SATURATION: 98 % | SYSTOLIC BLOOD PRESSURE: 108 MMHG | HEIGHT: 70 IN | WEIGHT: 188 LBS | BODY MASS INDEX: 26.92 KG/M2 | RESPIRATION RATE: 16 BRPM | DIASTOLIC BLOOD PRESSURE: 84 MMHG

## 2024-11-01 DIAGNOSIS — Z13.21 ENCOUNTER FOR VITAMIN DEFICIENCY SCREENING: ICD-10-CM

## 2024-11-01 DIAGNOSIS — Z00.00 WELLNESS EXAMINATION: ICD-10-CM

## 2024-11-01 DIAGNOSIS — Z13.220 LIPID SCREENING: ICD-10-CM

## 2024-11-01 DIAGNOSIS — Z12.5 PROSTATE CANCER SCREENING: ICD-10-CM

## 2024-11-01 DIAGNOSIS — Z13.0 SCREENING FOR DEFICIENCY ANEMIA: ICD-10-CM

## 2024-11-01 DIAGNOSIS — Z00.00 WELLNESS EXAMINATION: Primary | ICD-10-CM

## 2024-11-01 PROBLEM — K40.90 RIGHT INGUINAL HERNIA: Status: RESOLVED | Noted: 2024-05-02 | Resolved: 2024-11-01

## 2024-11-01 LAB
ALBUMIN SERPL-MCNC: 4.9 G/DL (ref 3.2–4.8)
ALBUMIN/GLOB SERPL: 2 {RATIO} (ref 1–2)
ALP LIVER SERPL-CCNC: 61 U/L
ALT SERPL-CCNC: 17 U/L
ANION GAP SERPL CALC-SCNC: 7 MMOL/L (ref 0–18)
AST SERPL-CCNC: 21 U/L (ref ?–34)
BASOPHILS # BLD AUTO: 0.02 X10(3) UL (ref 0–0.2)
BASOPHILS NFR BLD AUTO: 0.3 %
BILIRUB SERPL-MCNC: 1 MG/DL (ref 0.2–1.1)
BUN BLD-MCNC: 25 MG/DL (ref 9–23)
CALCIUM BLD-MCNC: 9.9 MG/DL (ref 8.7–10.4)
CHLORIDE SERPL-SCNC: 107 MMOL/L (ref 98–112)
CHOLEST SERPL-MCNC: 179 MG/DL (ref ?–200)
CO2 SERPL-SCNC: 28 MMOL/L (ref 21–32)
COMPLEXED PSA SERPL-MCNC: 1.32 NG/ML (ref ?–4)
CREAT BLD-MCNC: 1.34 MG/DL
EGFRCR SERPLBLD CKD-EPI 2021: 60 ML/MIN/1.73M2 (ref 60–?)
EOSINOPHIL # BLD AUTO: 0.06 X10(3) UL (ref 0–0.7)
EOSINOPHIL NFR BLD AUTO: 1 %
ERYTHROCYTE [DISTWIDTH] IN BLOOD BY AUTOMATED COUNT: 12.6 %
FASTING PATIENT LIPID ANSWER: YES
FASTING STATUS PATIENT QL REPORTED: YES
GLOBULIN PLAS-MCNC: 2.4 G/DL (ref 2–3.5)
GLUCOSE BLD-MCNC: 107 MG/DL (ref 70–99)
HCT VFR BLD AUTO: 47.2 %
HDLC SERPL-MCNC: 59 MG/DL (ref 40–59)
HGB BLD-MCNC: 16.1 G/DL
IMM GRANULOCYTES # BLD AUTO: 0.02 X10(3) UL (ref 0–1)
IMM GRANULOCYTES NFR BLD: 0.3 %
LDLC SERPL CALC-MCNC: 109 MG/DL (ref ?–100)
LYMPHOCYTES # BLD AUTO: 1.65 X10(3) UL (ref 1–4)
LYMPHOCYTES NFR BLD AUTO: 27.7 %
MCH RBC QN AUTO: 31.9 PG (ref 26–34)
MCHC RBC AUTO-ENTMCNC: 34.1 G/DL (ref 31–37)
MCV RBC AUTO: 93.5 FL
MONOCYTES # BLD AUTO: 0.5 X10(3) UL (ref 0.1–1)
MONOCYTES NFR BLD AUTO: 8.4 %
NEUTROPHILS # BLD AUTO: 3.71 X10 (3) UL (ref 1.5–7.7)
NEUTROPHILS # BLD AUTO: 3.71 X10(3) UL (ref 1.5–7.7)
NEUTROPHILS NFR BLD AUTO: 62.3 %
NONHDLC SERPL-MCNC: 120 MG/DL (ref ?–130)
OSMOLALITY SERPL CALC.SUM OF ELEC: 299 MOSM/KG (ref 275–295)
PLATELET # BLD AUTO: 301 10(3)UL (ref 150–450)
POTASSIUM SERPL-SCNC: 4.8 MMOL/L (ref 3.5–5.1)
PROT SERPL-MCNC: 7.3 G/DL (ref 5.7–8.2)
RBC # BLD AUTO: 5.05 X10(6)UL
SODIUM SERPL-SCNC: 142 MMOL/L (ref 136–145)
TRIGL SERPL-MCNC: 55 MG/DL (ref 30–149)
VIT D+METAB SERPL-MCNC: 53.8 NG/ML (ref 30–100)
VLDLC SERPL CALC-MCNC: 9 MG/DL (ref 0–30)
WBC # BLD AUTO: 6 X10(3) UL (ref 4–11)

## 2024-11-01 PROCEDURE — 82306 VITAMIN D 25 HYDROXY: CPT | Performed by: FAMILY MEDICINE

## 2024-11-01 PROCEDURE — 85025 COMPLETE CBC W/AUTO DIFF WBC: CPT | Performed by: FAMILY MEDICINE

## 2024-11-01 PROCEDURE — 84153 ASSAY OF PSA TOTAL: CPT | Performed by: FAMILY MEDICINE

## 2024-11-01 PROCEDURE — 80061 LIPID PANEL: CPT | Performed by: FAMILY MEDICINE

## 2024-11-01 PROCEDURE — 80053 COMPREHEN METABOLIC PANEL: CPT | Performed by: FAMILY MEDICINE

## 2024-11-01 NOTE — PROGRESS NOTES
HPI:   Trey Aguilar is a 64 year old male who presents for an Annual Health Visit.     Just had inguinal hernia repair, doing well in recovering on this. NO particular concerns or worries.    No other major changes in life otherwise.          Allergies:     Allergies   Allergen Reactions    Penicillins RASH and SWELLING     \"Penicillin V Potassium (critical)\" per E-Clinical       CURRENT MEDICATIONS   Current Outpatient Medications   Medication Sig Dispense Refill    VALACYCLOVIR 1 G Oral Tab Take 1 tablet (1,000 mg total) by mouth 2 (two) times daily. 20 tablet 0    PRAVASTATIN 20 MG Oral Tab Take 1 tablet (20 mg total) by mouth nightly. 90 tablet 0    TADALAFIL 20 MG Oral Tab TAKE ONE TABLET BY MOUTH DAILY AS NEEDED FOR ERECTILE DYSFUNCTION 12 tablet 0      HISTORICAL INFORMATION   Past Medical History:    Abdominal pain    Acute pain due to trauma    Arthritis    Arthritis of left shoulder region    Cervicalgia    Chest discomfort    Creatinine elevation    Decorative tattoo    Diverticulosis of intestine    Finger injury    Hemorrhoids    High cholesterol    Hyperlipidemia    Maxillary sinus cyst    2 cm mucous retention cyst Rt maxillary sinus    Neck pain    w/ disc degeneration & narrowing of C3-C7 discs & small focal disc herniation at C4    Right inguinal hernia    Shingles    Shoulder dislocation    Rt    Unspecified disorders of bursae and tendons in shoulder region      Past Surgical History:   Procedure Laterality Date    Arthroscopy of joint unlisted Right     Colonoscopy      Electrocardiogram, complete  08/28/2003    Hernia surgery  10/14/24    Knee arthroscopy  March 2018    Other surgical history  11/28/2012    endoscopy    Other surgical history Left     shoulder replacement at [facility]      Family History   Problem Relation Age of Onset    Lipids Father         hyperlipidemia    Arthritis Father         OA    Hypertension Father     Heart Disease Father         CAD    Heart  Surgery Father         CABG    Lipids Mother         hyperlipidemia    Other (Alzheimers) Sister     Other (autistic) Son       SOCIAL HISTORY   Social History     Socioeconomic History    Marital status:    Tobacco Use    Smoking status: Never     Passive exposure: Yes    Smokeless tobacco: Never    Tobacco comments:     Smoke 1 cigar a week   Vaping Use    Vaping status: Never Used   Substance and Sexual Activity    Alcohol use: Yes     Alcohol/week: 3.0 standard drinks of alcohol     Types: 3 Cans of beer per week     Comment: SOC    Drug use: No   Other Topics Concern    Caffeine Concern No    Exercise No    Seat Belt No    Special Diet No    Stress Concern No    Weight Concern No     Social History     Social History Narrative    Not on file        REVIEW OF SYSTEMS:     Constitutional:  some night sweats,   Eyes: negative  ENT: negative  Respiratory: negative  Cardiovascular: negative  Gastrointestinal: negative  Integument/Breast: negative  Genitourinary: negative  Heme/Lymph: negative  Musculoskeletal: negative  Neurological: negative  Psych: negative  Endocrine: negative  Allergic/Immune: negative    EXAM:   /84   Pulse 72   Resp 16   Ht 5' 10\" (1.778 m)   Wt 188 lb (85.3 kg)   SpO2 98%   BMI 26.98 kg/m²    Wt Readings from Last 6 Encounters:   11/01/24 188 lb (85.3 kg)   10/28/24 185 lb (83.9 kg)   09/24/24 182 lb (82.6 kg)   04/04/24 190 lb (86.2 kg)   03/15/24 190 lb (86.2 kg)   10/30/23 190 lb (86.2 kg)     Body mass index is 26.98 kg/m².    General: alert, appears stated age, and cooperative  Head: Normocephalic, without obvious abnormality, atraumatic  Eyes: conjunctivae/corneas clear. PERRL, EOM's intact. Fundi benign.  Ears: normal TM's and external ear canals both ears  Nose: Nares normal. Septum midline. Mucosa normal. No drainage or sinus tenderness.  Throat: lips, mucosa, and tongue normal; teeth and gums normal  Neck: no adenopathy, no carotid bruit, no JVD, supple,  symmetrical, trachea midline, and thyroid not enlarged, symmetric, no tenderness/mass/nodules  Heart: S1, S2 normal, no murmur, click, rub or gallop, regular rate and rhythm  Lungs: clear to auscultation bilaterally  Chest wall: no tenderness  Abdomen: soft, non-tender; bowel sounds normal; no masses,  no organomegaly  : deferred  Back: symmetric, no curvature. ROM normal. No CVA tenderness.  Extremities: extremities normal, atraumatic, no cyanosis or edema  Pulses: 2+ and symmetric  Skin: Skin color, texture, turgor normal. No rashes or lesions  Lymph Nodes: Cervical, supraclavicular, and axillary nodes normal.  Neurologic: Grossly normal    ASSESSMENT AND PLAN:   Shanda was seen today for physical.    Diagnoses and all orders for this visit:    Wellness examination  -     Comp Metabolic Panel (14); Future  -     CBC With Differential With Platelet; Future  -     Lipid Panel; Future    Lipid screening  -     Lipid Panel; Future    Prostate cancer screening  -     PSA Screen; Future    Screening for deficiency anemia  -     CBC With Differential With Platelet; Future    Encounter for vitamin deficiency screening  -     Vitamin D [E]; Future      There are no Patient Instructions on file for this visit.    The patient indicates understanding of these issues and agrees to the plan.    Problem List:  Patient Active Problem List   Diagnosis    Disorders of bursae and tendons in shoulder region, unspecified    Cervicalgia    History of adenomatous polyp of colon    Diverticulosis    Internal hemorrhoids       Yogesh Ulrich MD  11/1/2024  8:32 AM

## 2024-11-04 NOTE — TELEPHONE ENCOUNTER
The 10-year ASCVD risk score (Palak TALAMANTES, et al., 2019) is: 6.2%    Values used to calculate the score:      Age: 64 years      Sex: Male      Is Non- : No      Diabetic: No      Tobacco smoker: No      Systolic Blood Pressure: 108 mmHg      Is BP treated: No      HDL Cholesterol: 59 mg/dL      Total Cholesterol: 179 mg/dL      Overall labs look good, creatinine was a bit up, so I recommend keeping well hydrated, vascular risk is still low, but a diet low in added sugars and processed foods, and high in plant based foods is always a good idea.    No particular concerns with labs    Yogesh Ulrich MD

## 2024-12-26 DIAGNOSIS — E78.2 MIXED HYPERLIPIDEMIA: ICD-10-CM

## 2024-12-27 RX ORDER — PRAVASTATIN SODIUM 20 MG
20 TABLET ORAL NIGHTLY
Qty: 90 TABLET | Refills: 0 | Status: SHIPPED | OUTPATIENT
Start: 2024-12-27

## 2025-01-02 RX ORDER — VALACYCLOVIR HYDROCHLORIDE 1 G/1
1000 TABLET, FILM COATED ORAL 2 TIMES DAILY
Qty: 20 TABLET | Refills: 0 | Status: SHIPPED | OUTPATIENT
Start: 2025-01-02

## 2025-04-26 RX ORDER — VALACYCLOVIR HYDROCHLORIDE 1 G/1
1000 TABLET, FILM COATED ORAL 2 TIMES DAILY
Qty: 20 TABLET | Refills: 0 | Status: SHIPPED | OUTPATIENT
Start: 2025-04-26

## 2025-05-11 DIAGNOSIS — E78.2 MIXED HYPERLIPIDEMIA: ICD-10-CM

## 2025-05-12 RX ORDER — PRAVASTATIN SODIUM 20 MG
20 TABLET ORAL NIGHTLY
Qty: 90 TABLET | Refills: 0 | Status: SHIPPED | OUTPATIENT
Start: 2025-05-12

## 2025-05-12 NOTE — TELEPHONE ENCOUNTER
Requesting   Requested Prescriptions     Pending Prescriptions Disp Refills    PRAVASTATIN 20 MG Oral Tab [Pharmacy Med Name: Pravastatin Sodium 20 Mg Tab Nort] 90 tablet 0     Sig: Take 1 tablet (20 mg total) by mouth nightly.     LOV: 11/1/24    Filled:  12/27/24 #90 with 0 refills    No future appointments.

## 2025-08-11 RX ORDER — VALACYCLOVIR HYDROCHLORIDE 1 G/1
1000 TABLET, FILM COATED ORAL 2 TIMES DAILY
Qty: 20 TABLET | Refills: 0 | Status: SHIPPED | OUTPATIENT
Start: 2025-08-11

## (undated) DIAGNOSIS — E78.2 MIXED HYPERLIPIDEMIA: ICD-10-CM

## (undated) DEVICE — ADHESIVE SKIN TOP FOR WND CLSR DERMBND ADV

## (undated) DEVICE — COLUMN DRAPE

## (undated) DEVICE — DRAPE,TOP,102X53,STERILE: Brand: MEDLINE

## (undated) DEVICE — TRAY CATH 16FR F INCL BARDX IC COMPLT CARE

## (undated) DEVICE — BINDER ABD UNISX 9IN 62IN L AND XL UNIV

## (undated) DEVICE — ROBOTIC GENERAL: Brand: MEDLINE INDUSTRIES, INC.

## (undated) DEVICE — BLADELESS OBTURATOR: Brand: WECK VISTA

## (undated) DEVICE — CADIERE FORCEPS: Brand: ENDOWRIST

## (undated) DEVICE — ARM DRAPE

## (undated) DEVICE — MEGA SUTURECUT ND: Brand: ENDOWRIST

## (undated) DEVICE — LAPAROVUE VISIBILITY SYSTEM LAPAROSCOPIC SOLUTIONS: Brand: LAPAROVUE

## (undated) DEVICE — SKN PREP SPNG STKS PVP PNT STR: Brand: MEDLINE INDUSTRIES, INC.

## (undated) DEVICE — GLOVE SUR 8 SENSICARE PI PIP CRM PWD F

## (undated) DEVICE — TIP COVER ACCESSORY

## (undated) DEVICE — MONOPOLAR CURVED SCISSORS: Brand: ENDOWRIST

## (undated) DEVICE — SEAL

## (undated) DEVICE — ABSORBABLE WOUND CLOSURE DEVICE: Brand: V-LOC 90

## (undated) DEVICE — 40580 - THE PINK PAD - ADVANCED TRENDELENBURG POSITIONING KIT: Brand: 40580 - THE PINK PAD - ADVANCED TRENDELENBURG POSITIONING KIT

## (undated) DEVICE — [HIGH FLOW INSUFFLATOR,  DO NOT USE IF PACKAGE IS DAMAGED,  KEEP DRY,  KEEP AWAY FROM SUNLIGHT,  PROTECT FROM HEAT AND RADIOACTIVE SOURCES.]: Brand: PNEUMOSURE

## (undated) DEVICE — SUT MCRYL 4-0 18IN PS-2 ABSRB UD 19MM 3/8 CIR

## (undated) DEVICE — ENDOPATH ULTRA VERESS INSUFFLATION NEEDLES WITH LUER LOCK CONNECTORS: Brand: ENDOPATH

## (undated) DEVICE — STERILE DRAPE FOR USE WITH SITUATE ROOM SCANNER: Brand: SITUATE

## (undated) DEVICE — COVER,LIGHT,CAMERA,HARD,1/PK,STRL: Brand: MEDLINE

## (undated) NOTE — LETTER
24    Patient: Trey Aguilar  : 1960 Visit date: 2024    Dear  Yogesh Ulrich MD    Thank you for referring Trey Aguilar to my practice.  Please find my assessment and plan below.     Assessment   1. Right inguinal hernia          Plan     The patient will be scheduled for robotic repair of right inguinal hernia with mesh.    The deepak-operative care plan was discussed with the patient, who voices understanding.  Activity and lifting recommendations were discussed in length.     The risks, benefits, and alternatives to the procedure were explained to the patient.  The risks explained include, but are not limited to, bleeding, infection, pain wound complications, recurrence, incorrect diagnosis, injury to adjacent organs and structures. We also discussed the possibile need for further therapeutic, diagnostic, or surgical intervention.  The patient voiced understanding, and after all questions were answered to the patient's satisfaction, the patient provided willing and informed consent to proceed.      Sincerely,       Shon López MD   CC:   No Recipients